# Patient Record
Sex: FEMALE | Race: WHITE | HISPANIC OR LATINO | Employment: FULL TIME | URBAN - METROPOLITAN AREA
[De-identification: names, ages, dates, MRNs, and addresses within clinical notes are randomized per-mention and may not be internally consistent; named-entity substitution may affect disease eponyms.]

---

## 2021-02-22 ENCOUNTER — AMBULATORY - HEALTHEAST (OUTPATIENT)
Dept: LAB | Facility: CLINIC | Age: 41
End: 2021-02-22

## 2021-02-22 DIAGNOSIS — O99.810 ABNORMAL MATERNAL GLUCOSE TOLERANCE, ANTEPARTUM: ICD-10-CM

## 2021-02-22 LAB
FASTING STATUS PATIENT QL REPORTED: YES
GLUCOSE 1H P 100 G GLC PO SERPL-MCNC: 181 MG/DL (ref 70–179)
GLUCOSE 2H P 100 G GLC PO SERPL-MCNC: 162 MG/DL (ref 70–154)
GLUCOSE 3H P 100 G GLC PO SERPL-MCNC: 139 MG/DL (ref 70–139)
GLUCOSE P FAST SERPL-MCNC: 98 MG/DL (ref 70–94)

## 2021-02-23 ENCOUNTER — RECORDS - HEALTHEAST (OUTPATIENT)
Dept: ADMINISTRATIVE | Facility: OTHER | Age: 41
End: 2021-02-23

## 2021-02-25 ENCOUNTER — COMMUNICATION - HEALTHEAST (OUTPATIENT)
Dept: ADMINISTRATIVE | Facility: CLINIC | Age: 41
End: 2021-02-25

## 2021-03-11 ENCOUNTER — COMMUNICATION - HEALTHEAST (OUTPATIENT)
Dept: EDUCATION SERVICES | Facility: CLINIC | Age: 41
End: 2021-03-11

## 2021-03-11 ENCOUNTER — PATIENT OUTREACH (OUTPATIENT)
Dept: EDUCATION SERVICES | Facility: CLINIC | Age: 41
End: 2021-03-11
Payer: COMMERCIAL

## 2021-03-11 DIAGNOSIS — O24.410 DIET CONTROLLED GESTATIONAL DIABETES MELLITUS (GDM) IN THIRD TRIMESTER: Primary | ICD-10-CM

## 2021-03-11 DIAGNOSIS — O24.419 GESTATIONAL DIABETES: ICD-10-CM

## 2021-03-11 PROCEDURE — 98967 PH1 ASSMT&MGMT NQHP 11-20: CPT | Mod: TEL

## 2021-03-11 NOTE — PROGRESS NOTES
Diabetes Self-Management Education & Support    SUBJECTIVE/OBJECTIVE:  Presents for education related to gestational diabetes.    Accompanied by: Self  Diabetes management related comments/concerns: Dx 3 weeks ago, cutting down on carbohydrates and cut out sugar  Gestational weeks: 31 weeks  Number of previous preganancies: 0  Previously had Gestational Diabetes: No    Cultural Influences/Ethnic Background:  [place]    Estimated Date of Delivery: Data Unavailable    1 hour OGTT  No results found for: GLU1    3 hour OGTT  3 hour GTT - 2021     Fastin   1 hour: 181   2 hours: 162   3 hours: 139    Lifestyle and Health Behaviors:  Exercise:: Yes  Days per week of moderate to strenuous exercise (like a brisk walk): 3  On average, minutes per day of exercise at this level: 30  How intense was your typical exercise? : Light (like stretching or slow walking)  Exercise Minutes per Week: 90  Barrier to exercise: None  Meal planning/habits: Carb counting  Meals include: Breakfast, Dinner, Morning Snack, Afternoon Snack, Lunch  Breakfast: wake up 8 am - breakfast at 9 am: decaf with half and half + scrambled eggs + low carb ww toast  Dinner: grilled chicken, salad, 2 corn tortillas  Snacks: fruit - cheese - veggies with ranch  Other: bedtime 10 - 11 pm, last food at 8 pm  Beverages: Coffee, Water(decaf)  How many servings of fruits/vegetables per day: 5  Biggest challenges to healthy eating: None    Healthy Coping:  Emotional response to diabetes: Ready to learn, Acceptance  Informal Support system:: Family  Stage of change: ACTION (Actively working towards change)    Current Management:  Taking medications for gestational diabetes?: No    ASSESSMENT:  Reviewed target blood glucose values, sharps disposal, diagnosis criteria for GDM and importance of good blood glucose management for health of mom and baby. Patient advised to call if 3 blood glucose elevated before returns next week. Instructed on ketone checking and  told to call if unable to get ketones negative.    Discussed carbohydrate sources and impact on blood glucose. Reviewed basics of healthy eating and incorporating a variety of foods into meal plan. Instructed on carbohydrate counting and label reading and recommended patient consume 2 CHO for breakfast, 3-4 CHO for lunch and dinner and 1-2 CHO for each snack, 3 snacks a day.     Discussed importance of not going too low in carbohydrates since that may cause liver to produce excess glucose and contribute to elevated blood glucose readings and ketone formation. Encouraged eating breakfast within 1 hour of waking.  To also help prevent against ketone formation, protein was encouraged with meals and snacks, especially with the night snack. Reviewed benefits of exercising to help lower blood glucose and walking after meals, as tolerated and per MD approval, if seeing elevated blood glucose after a meal. Pt verbalized understanding of concepts discussed and recommendations provided.    INTERVENTION:  Accu-Chek Guide Me Meter ordered through [employer].     Educational topics covered today:  GDM diagnosis, pathophysiology, Risks and Complications of GDM, Means of controlling GDM, Using a Blood Glucose Monitor, Blood Glucose Goals, Logging and Interpreting Glucose Results, Ketone Testing, When to Call a Diabetes Educator or OB Provider, Healthy Eating During Pregnancy, Counting Carbohydrates, Meal Planning for GDM, and Physical Activity    Educational materials provided today:   Hi Understanding Gestational Diabetes  GDM Log Book  HS Snack List  Meal plan  Sharps Disposal  Care After Delivery  Link to video for Accu-chek Guide Me meter    Pt verbalized understanding of concepts discussed and recommendations provided today.     PLAN:  Check glucose 4 times daily, before breakfast and 1 hour after each meal.     Check Ketones daily for one week, if negative, reduce testing to once a week.     Physical activity  recommended: 20-30 minutes most day.    Meal plan: 2 carbs at breakfast, 3-4 carbs at lunch, 3-4 carbs at supper, 1-2 carbs at 3 snacks a day.  Follow consistent CHO meal plan, eat CHO and protein/fat at all meals/snacks.    Call/e-mail/MyChart message diabetes educator if 3 or more blood sugars are above the goal in 1 week, if ketones are positive, or with questions/concerns.    Jaelyn Mera, MPH, RDN, LD, CDCES   Time Spent: 45 minutes  Encounter Type: Individual    Any diabetes medication dose changes were made via the CDE Protocol and Collaborative Practice Agreement with the patient's referring OB/GYN provider. A copy of this encounter was shared with the provider.

## 2021-03-18 ENCOUNTER — VIRTUAL VISIT (OUTPATIENT)
Dept: EDUCATION SERVICES | Facility: CLINIC | Age: 41
End: 2021-03-18
Payer: COMMERCIAL

## 2021-03-18 DIAGNOSIS — O24.410 DIET CONTROLLED GESTATIONAL DIABETES MELLITUS (GDM) IN THIRD TRIMESTER: Primary | ICD-10-CM

## 2021-03-18 PROCEDURE — 98967 PH1 ASSMT&MGMT NQHP 11-20: CPT | Mod: TEL

## 2021-03-18 NOTE — PROGRESS NOTES
Diabetes Self-Management Education & Support    Type of Service: Telephone Visit    How would patient like to obtain AVS? email      SUBJECTIVE/OBJECTIVE:  Presents for education related to gestational diabetes.    Accompanied by: Self  Diabetes management related comments/concerns: Fasting glucose have been mostly elevated, except one day was 88.  Gestational weeks: 32 weeks  Number of previous preganancies: 0  Previously had Gestational Diabetes: No    Cultural Influences/Ethnic Background:  [place]    There were no vitals taken for this visit. Virtual visit did not allow for taking vitals.    Estimated Date of Delivery: Data Unavailable    Blood Glucose/Ketone Log:  Fasting -104, except one day was 88.  Post-prandial BG all under 140 (114-121), except one day 153 when she ate more than she should have.     Lifestyle and Health Behaviors:  Exercise:: Yes  Days per week of moderate to strenuous exercise (like a brisk walk): 3  On average, minutes per day of exercise at this level: 30  How intense was your typical exercise? : Light (like stretching or slow walking)  Exercise Minutes per Week: 90  Barrier to exercise: None  Meal planning/habits: Carb counting  Meals include: Breakfast, Dinner, Morning Snack, Afternoon Snack, Lunch  Breakfast: wake up 8 am - breakfast at 9 am: decaf with half and half + scrambled eggs + low carb ww toast  Dinner: grilled chicken, salad, 2 corn tortillas  Snacks: cheese and whole wheat crackers  Other: bedtime 10 - 11 pm, last food at 8 pm  Beverages: Coffee, Water(decaf)  How many servings of fruits/vegetables per day: 5  Biggest challenges to healthy eating: None  Pre- vitamin?: Yes    Healthy Coping:  Emotional response to diabetes: Ready to learn, Acceptance  Informal Support system:: Family  Stage of change: ACTION (Actively working towards change)    Current Management:  Taking medications for gestational diabetes?: No    ASSESSMENT:  Fasting blood glucoses: 14% in  target.  After breakfast: 100% in target.  After lunch: 100% in target.  After dinner: 86% in target.    Fasting BG above goal, but bedtime snack is fewer carbohydrates and protein than recommended. Advised to ensure 30 g carbohydrate and aim for ~15 g protein - provided examples and will e-mail bedtime snack list.     INTERVENTION:  Educational topics covered today:  Ensuring adequate carbohydrate/protein/fat at bedtime snack to keep fasting BG in goal    Educational Materials provided today:  No new materials provided today    PLAN:  Check glucose 4 times daily.  Check ketones once a week when readings are consistently negative.  Continue with recommended physical activity.  Continue to follow recommended meal plan: 2 carbs at breakfast, 3-4 carbs at lunch, 3-4 carbs at supper, 1-2 carbs at snacks.  2 carbohydrates and ~15 g protein at bedtime snack.  Follow consistent CHO meal plan, eat CHO and protein/fat at all meals/snacks.    Follow-up on Monday, 3/22 at 10:30 am for BG review.    Jaelyn Mera, MPH, RDN, LD, CDCES   Time Spent: 16 minutes  Encounter Type: Individual    Any diabetes medication dose changes were made via the CDE Protocol and Collaborative Practice Agreement with the patient's OB/GYN provider. A copy of this encounter was shared with the provider.

## 2021-03-22 ENCOUNTER — COMMUNICATION - HEALTHEAST (OUTPATIENT)
Dept: EDUCATION SERVICES | Facility: CLINIC | Age: 41
End: 2021-03-22

## 2021-03-22 ENCOUNTER — VIRTUAL VISIT (OUTPATIENT)
Dept: EDUCATION SERVICES | Facility: CLINIC | Age: 41
End: 2021-03-22
Payer: COMMERCIAL

## 2021-03-22 DIAGNOSIS — O24.414 INSULIN CONTROLLED GESTATIONAL DIABETES MELLITUS (GDM) IN SECOND TRIMESTER: ICD-10-CM

## 2021-03-22 DIAGNOSIS — O24.410 DIET CONTROLLED GESTATIONAL DIABETES MELLITUS (GDM) IN THIRD TRIMESTER: Primary | ICD-10-CM

## 2021-03-22 NOTE — PROGRESS NOTES
Gestational Diabetes Follow-up    Type of Service: Telephone Visit    How would patient like to obtain AVS? Email      Subjective/Objective:    Amelia Olivares sent in blood glucose log for review. Last date of communication was: 3/18/21.    Gestational diabetes is being managed with diet and activity    Taking diabetes medications: no    Estimated Date of Delivery: Data Unavailable    BG/Food Log:   Fasting B, 101, 94, 102  Post-prandial all in goal except 147 mg/dL 2 hours after brown rice and steak with vegetables for dinner last night. Was stressed yesterday with having a virtual baby shower and worrying that it would work right.    Assessment:    Fasting blood glucoses: 25% in target.  After breakfast: 100% in target.  After lunch: 100% in target.  After dinner: 75% in target.    Fasting glucose remain above goal, despite patient having 30 g carbohydrate and 15 grams protein at bedtime snack. Discussed insulin start. She is willing as this will help keep baby healthy. This note faxed to OB via [employer]. Will proceed with getting insulin orders from Julee Holliday NP, and providing insulin start education tomorrow at 2:30 pm. Patient reports fear of needles and sometimes faints when she sees long needles. Reassured that insulin pen needles are 4mm - small, short and thin - discussed option for Auto-cover needles where she does not see the needle at all. Will send her e-mail with AVS and picture of these so she can decide what needle type she'd prefer to try.    Plan/Response:  Recommend that patient begin NPH insulin at bedtime in pen form. Per Julee Holliday NP, plan to start with 8 units at bedtime and increase by 2 units every 2 days until fasting BG are less than 95. This will be ordered in [facility] instance of [employer].     Follow-up tomorrow at 2:30 pm for insulin start education.    Jaelyn Mera, MPH, RDN, LD, CDCES   Time Spent: 15 minutes  Encounter type: Individual    Any diabetes medication  dose changes were made via the CDE Protocol and Collaborative Practice Agreement with the patient's endocrinology provider and OB/GYN provider. A copy of this encounter was shared with the provider.

## 2021-03-22 NOTE — LETTER
3/22/2021         RE: Amelia Olivares  [address] [place] [address]        Dear Colleague,    Thank you for referring your patient, Amelia Olivares, to the [facility].     Based on continued fasting glucose elevation, recommend NPH insulin start at bedtime - 8 units per Julee Holliday NP, per [facility] protocol. Amelia will continue to be followed for insulin dose adjustment until delivery.     Please see a copy of my visit note below.    Gestational Diabetes Follow-up    Type of Service: Telephone Visit    How would patient like to obtain AVS? Email      Subjective/Objective:    Amelia Olivares sent in blood glucose log for review. Last date of communication was: 3/18/21.    Gestational diabetes is being managed with diet and activity    Taking diabetes medications: no    Estimated Date of Delivery: Data Unavailable    BG/Food Log:   Fasting B, 101, 94, 102  Post-prandial all in goal except 147 mg/dL 2 hours after brown rice and steak with vegetables for dinner last night. Was stressed yesterday with having a virtual baby shower and worrying that it would work right.    Assessment:    Fasting blood glucoses: 25% in target.  After breakfast: 100% in target.  After lunch: 100% in target.  After dinner: 75% in target.    Fasting glucose remain above goal, despite patient having 30 g carbohydrate and 15 grams protein at bedtime snack. Discussed insulin start. She is willing as this will help keep baby healthy. This note faxed to OB via [employer]. Will proceed with getting insulin orders from Julee Holilday NP, and providing insulin start education tomorrow at 2:30 pm. Patient reports fear of needles and sometimes faints when she sees long needles. Reassured that insulin pen needles are 4mm - small, short and thin - discussed option for Auto-cover needles where she does not see the needle at all. Will send her e-mail with AVS and picture of these so  she can decide what needle type she'd prefer to try.    Plan/Response:  Recommend that patient begin NPH insulin at bedtime in pen form. Per Julee Holliday NP, plan to start with 8 units at bedtime and increase by 2 units every 2 days until fasting BG are less than 95. This will be ordered in [facility] instance of [employer]     Follow-up tomorrow at 2:30 pm for insulin start education.    Jaelyn Mera, MPH, RDN, LD, CDCES   Time Spent: 15 minutes  Encounter type: Individual    Any diabetes medication dose changes were made via the CDE Protocol and Collaborative Practice Agreement with the patient's endocrinology provider and OB/GYN provider. A copy of this encounter was shared with the provider.

## 2021-03-23 ENCOUNTER — VIRTUAL VISIT (OUTPATIENT)
Dept: EDUCATION SERVICES | Facility: CLINIC | Age: 41
End: 2021-03-23
Payer: COMMERCIAL

## 2021-03-23 DIAGNOSIS — O24.410 DIET CONTROLLED GESTATIONAL DIABETES MELLITUS (GDM) IN THIRD TRIMESTER: Primary | ICD-10-CM

## 2021-03-23 NOTE — PATIENT INSTRUCTIONS
"Taking Insulin:    1. Take NPH (Humulin-N or Novolin-N) - 8 units at bedtime.     - Rotate injection sites, keeping at least 1 inch apart from last injection site and 2 inches away from belly button or surgical scars.    -If you have a cloudy insulin, mix the insulin gently by rolling between your hands 10 times and turning upside down and right side up 10 times. Do not shake.     2. Pen - Use a new pen needle for each injection. Always remove pen needle from the insulin pen after use and do not store insulin pens with the needle on the pen. Do a 2 unit \"prime\" before each injection, be sure a drop or stream of insulin comes out of the needle before you give your injection. After you inject, hold the needle under the skin to the count of 10 to be sure all of the insulin goes in.      3. Store insulin you are not using in the refrigerator (do not freeze). Take new insulin out of the refrigerator a few hours prior to use to bring to room temperature.     4. Once opened NPH Humulin N Marjorieikpens can be kept at room temperature for 14 days.. Do not use the opened insulin after this time has passed, even if there is still medicine inside.     5. Call your doctor or diabetes educator if you begin having low blood sugars or if you have questions or concerns.     6. Complete and mail in the form to inform the [place] [employer] that you have started taking insulin.    7. Follow-up: Follow-up diabetes education appointment scheduled on 3/25/21 at 2:00 pm.    [facility] for the [facility]:  For Your Diabetes Education or Nutrition Appointments Call:  [phone]   For Diabetes or Nutrition Related Questions:   Phone: [phone]  Send GetYou Message   If you need a medication refill please contact your pharmacy. Please allow 3 business days for your refills to be completed.       "

## 2021-03-23 NOTE — PROGRESS NOTES
Gestational Diabetes Follow-up    Type of Service: Telephone Visit    How would patient like to obtain AVS? email    Subjective/Objective:    Amelia Olivares is following up today for review of insulin start instructions.     Gestational diabetes is being managed with diet and activity    Taking diabetes medications: no, will be starting insulin at bedtime.    Estimated Date of Delivery: Data Unavailable    Intervention:  Insulin injection technique taught using a Pen for NPH - 8 units at bedtime. Patient verbalized understanding and was able to perform an accurate return demonstration of injection technique.  Discussed mixing insulin, storage, sharps, new needle each use, site selection, action of insulin and hypoglycemia signs, symptoms and treatment.      Assessment:  Amelia verbalized understanding of insulin administration.       Plan/Response:  Follow-up on Thursday, 3/25.  Follow up with Pregnancy Clinic weekly thereafter.    Jaelyn Mera, MPH, RDN, LD, CDCES   Time spent: 15 minutes  Individual encounter    Any diabetes medication dose changes were made via the CDE Protocol and Collaborative Practice Agreement with the patient's endocrinology provider and OB/GYN provider. A copy of this encounter was shared with the provider.

## 2021-03-25 ENCOUNTER — VIRTUAL VISIT (OUTPATIENT)
Dept: EDUCATION SERVICES | Facility: CLINIC | Age: 41
End: 2021-03-25
Payer: COMMERCIAL

## 2021-03-25 DIAGNOSIS — O24.410 DIET CONTROLLED GESTATIONAL DIABETES MELLITUS (GDM) IN THIRD TRIMESTER: Primary | ICD-10-CM

## 2021-03-25 DIAGNOSIS — O24.414 INSULIN CONTROLLED GESTATIONAL DIABETES MELLITUS (GDM) IN THIRD TRIMESTER: ICD-10-CM

## 2021-03-25 RX ORDER — PEN NEEDLE, DIABETIC 32GX 5/32"
1 NEEDLE, DISPOSABLE MISCELLANEOUS DAILY
COMMUNITY
Start: 2021-03-23 | End: 2024-02-16

## 2021-03-25 NOTE — PROGRESS NOTES
Gestational Diabetes Follow-up    Subjective/Objective:    Amelia Olivares was called for a scheduled BG review. Last date of communication was: 3/23.    Gestational diabetes is being managed with diet, activity and medications    Taking diabetes medications:   yes:     Diabetes Medication(s)     Insulin       insulin  UNIT/ML injection    Inject 8 Units Subcutaneous At Bedtime          Estimated Date of Delivery: Data Unavailable    Blood Glucose/Ketone Log:  Fasting 3/24: 95, 3/25: 104    Assessment:    FBG remain above goal with 8 units. Increase by 2 units to 10 units, per Julee Holliday NP.    Plan/Response:  Recommend increase to insulin - 10 units. Increase by 2 units every 2 days if FBG above goal 50% of the time.   Follow-up with pregnancy clinic - phone number provided to call to schedule.   Otherwise no changes in the patient's current treatment plan.    Jaelyn Mera, MPH, RDN, LD, CDCES   Time Spent: 7 minutes    Any diabetes medication dose changes were made via the CDE Protocol and Collaborative Practice Agreement with the patient's endocrinology provider. A copy of this encounter was shared with the provider.

## 2021-04-07 ENCOUNTER — COMMUNICATION - HEALTHEAST (OUTPATIENT)
Dept: EDUCATION SERVICES | Facility: CLINIC | Age: 41
End: 2021-04-07

## 2021-04-07 ENCOUNTER — OFFICE VISIT - HEALTHEAST (OUTPATIENT)
Dept: ENDOCRINOLOGY | Facility: CLINIC | Age: 41
End: 2021-04-07

## 2021-04-07 DIAGNOSIS — O24.414 INSULIN CONTROLLED GESTATIONAL DIABETES MELLITUS (GDM) IN THIRD TRIMESTER: ICD-10-CM

## 2021-04-20 ENCOUNTER — AMBULATORY - HEALTHEAST (OUTPATIENT)
Dept: LAB | Facility: CLINIC | Age: 41
End: 2021-04-20

## 2021-04-20 ENCOUNTER — AMBULATORY - HEALTHEAST (OUTPATIENT)
Dept: OBGYN | Facility: CLINIC | Age: 41
End: 2021-04-20

## 2021-04-20 DIAGNOSIS — Z33.1 PREGNANT STATE, INCIDENTAL: ICD-10-CM

## 2021-04-20 LAB
SARS-COV-2 PCR COMMENT: NORMAL
SARS-COV-2 RNA SPEC QL NAA+PROBE: NEGATIVE
SARS-COV-2 VIRUS SPECIMEN SOURCE: NORMAL

## 2021-04-21 ENCOUNTER — OFFICE VISIT - HEALTHEAST (OUTPATIENT)
Dept: ENDOCRINOLOGY | Facility: CLINIC | Age: 41
End: 2021-04-21

## 2021-04-21 ENCOUNTER — COMMUNICATION - HEALTHEAST (OUTPATIENT)
Dept: EDUCATION SERVICES | Facility: CLINIC | Age: 41
End: 2021-04-21

## 2021-04-21 ENCOUNTER — COMMUNICATION - HEALTHEAST (OUTPATIENT)
Dept: SCHEDULING | Facility: CLINIC | Age: 41
End: 2021-04-21

## 2021-04-21 DIAGNOSIS — O24.414 INSULIN CONTROLLED GESTATIONAL DIABETES MELLITUS (GDM) IN THIRD TRIMESTER: ICD-10-CM

## 2021-04-23 ENCOUNTER — ANESTHESIA - HEALTHEAST (OUTPATIENT)
Dept: OBGYN | Facility: CLINIC | Age: 41
End: 2021-04-23

## 2021-06-15 NOTE — TELEPHONE ENCOUNTER
Records received  2/23/2021 4:43pm inside DM Consult  [facility] - [phone]  Referring: Dr Jorge Jackson  DX: GDM     2/25 - Records forwarded to [facility] for sooner appt.

## 2021-06-15 NOTE — TELEPHONE ENCOUNTER
Glucose meter and monitoring supplies sent to patient's pharmacy. GDM education started 3/11/2021.     Jaelyn Mera, MPH, RDN, LD, [facility]

## 2021-06-16 NOTE — ANESTHESIA PROCEDURE NOTES
Epidural Block    Patient location during procedure: OB  Time Called: 4/23/2021 6:47 PM  Reason for Block:labor epidural  Staffing:  Performing  Anesthesiologist: Todd Jackson MD  Preanesthetic Checklist  Completed: patient identified, risks, benefits, and alternatives discussed, timeout performed, consent obtained, at patient's request, airway assessed, oxygen available, suction available, emergency drugs available and hand hygiene performed  Procedure  Patient position: sitting  Prep: ChloraPrep and site prepped and draped  Patient monitoring: continuous pulse oximetry, blood pressure and heart rate  Approach: midline  Location: L3-L4  Injection technique: MAURISIO air  Number of Attempts:1  Needle  Needle type: Tuohy   Needle gauge: 18 G     Catheter in Space: 3  Assessment  Sensory level:  No complications      Additional Notes:  Routine placement on 1 attempt, catheter threaded easily without pain or paresthesias, negative aspiration and test dose negative. Patient more comfortable after procedure.

## 2021-06-16 NOTE — TELEPHONE ENCOUNTER
[facility] GDM Care Plan      Assessment/Plan: phone visit today. She is taking 26 units at HS. Doing very well. No concerns.     Pt is being induced tomorrow. Discussed pp care. Pt will call w/ any concerns.     4/15  Fasting  94  1 hour after:  B  112  L  134  D  125     4/16  Fasting  88  1 hour after:  B  131  L  124  D  136     4/17  Fasting  94  1 hour after:  B  120  L  116  D  131     4/18  Fasting  89  1 hour after:  B  114  L  116  D  130     4/19  Fasting  98  1 hour after:  B  139  L  116  D  162     4/20  Fasting  87  1 hour after:  B  104  L  106  D  134     4/21  Fasting  97  1 hour after:  B  120    Time: 20  Visit Type: telephone visit: pregnancy clinic   Provider: Renetta   Provider's Diagnosis (per referral form): Gestational (648.83)  OGTT:   Results for orders placed or performed in visit on 02/22/21   Glucose, Gestational Challenge 2 Hour   Result Value Ref Range    Glucose, 2 Hour 162 (H) 70 - 154 mg/dL   Glucose, Gestational Challenge 1 Hour   Result Value Ref Range    Glucose, 1 Hour 181 (H) 70 - 179 mg/dL   Glucose, Gestational Challenge Fasting   Result Value Ref Range    Glucose, Fasting 98 (H) 70 - 94 mg/dL    Patient Fasting > 8hrs? Yes    Glucose, Gestational Challenge 3 Hour   Result Value Ref Range    Glucose, 3 hour 139 70 - 139 mg/dL      Estimated Date of Delivery: 5/7 - being induced tomorrow   Pregnancy #: 1  Previous GDM: No   Medications:   Current Outpatient Medications:      ACCU-CHEK GUIDE ME GLUCOSE MTR Misc, Use 1 each As Directed as needed., Disp: 1 each, Rfl: 0     ACCU-CHEK GUIDE TEST STRIPS strips, Use 1 each As Directed 4 (four) times a day. Dispense brand per patient's insurance at pharmacy discretion., Disp: 150 strip, Rfl: 3     ACCU-CHEK SOFTCLIX LANCETS lancets, Use 1 each As Directed 4 (four) times a day. Dispense brand per patient's insurance at pharmacy discretion., Disp: 100 each, Rfl: 0     acetone, urine, test Strp, Check with morning urine daily for 1 week or  "until negative for 7 days, then check once a week., Disp: 50 strip, Rfl: 1     insulin NPH (HUMULIN N NPH INSULIN KWIKPEN) 100 unit/mL (3 mL) injection, Inject 8 Units under the skin at bedtime. (Patient taking differently: Inject 22 Units under the skin at bedtime. ), Disp: 15 mL, Rfl: 1     pen needle, diabetic (BD ULTRA-FINE OTIS PEN NEEDLE) 32 gauge x 5/32\" Ndle, Use one daily with insulin dose at bedtime., Disp: 100 each, Rfl: 2     pen needle, diabetic, safety (NOVOFINE AUTOCOVER) 30 gauge x 1/3\" Ndle, Use 1 each As Directed daily., Disp: 100 each, Rfl: 1      BG monitoring goals: Fasting <95; 1 hour post start of meal <140. Test 4 x per day.  Check fasting a.m. ketones: Yes  GDM meal pattern/carb counting taught per guidelines: Yes    Past Goals:  Nutrition: GDM meal plan MET  Activity: Walking after meals when able/staying active MET  Monitoring: BG 4x/day as directed, ketones every morning MET      New Goals:  Nutrition: GDM meal plan   Activity: Walking after meals when able/staying active   Monitoring: BG 4x/day as directed, ketones every morning    DIABETES CARE PLAN AND EDUCATION RECORD    Gestational Diabetes Disease Process/Preconception Care/Management During Pregnancy/Postpartum:Discussed    Meter (per above goals): Discussed    Nutrition Management    Weight: Assessed and Discussed  Portions/Balance: Assessed and Discussed  Carb ID/Count: Assessed and Discussed  Label Reading: Assessed and Discussed  Menu Planning: Assessed and Discussed  Dining Out: Assessed and Discussed  Physical Activity: Assessed and Discussed    Acute Complications: Prevent, Detect, Treat:    Goal Setting and Problem Solving: Assessed and Discussed  Barriers: Assessed and Discussed  Psychosocial Adjustments: Assessed and Discussed    "

## 2021-06-16 NOTE — TELEPHONE ENCOUNTER
[facility] GDM Care Plan      Assessment/Plan: phone visit today. She started insulin a couple weeks ago and has been titrating appropriately. She has not had any lows. Meals are well controlled, occasional small elevations. Ketones are normal.   Pt will continue with everything, continue titrating if needed. She will call w/ any concerns prior to f/u.     18 units at bedtime  4/01  Fasting  94  1 hour after:  B  132  L  112  D  125  18 units at bedtime     4/02  Fasting  94  1 hour after:  B  119  L  100 (2 hours after)  D  111  18 units at bedtime     4/03  Fasting  102  1 hour after:  B  115  L  113  D  144  18 units at bedtime     4/04  Fasting  100  1 hour after:  B  113  L  112  D  135  20 units at bedtime     4/05  Fasting  109  1 hour after:  B  94  L  127  D  150  22 units at bedtime     4/06  Fasting  91  1 hour after:  B  112  L  132  D  122  22 units at bedtime     4/07  Fasting  95  1 hour after:  B  115      Time: 20  Visit Type: telephone visit: pregnancy clinic   Provider: Renetta   Provider's Diagnosis (per referral form): Gestational (648.83)  OGTT:   Results for orders placed or performed in visit on 02/22/21   Glucose, Gestational Challenge 2 Hour   Result Value Ref Range    Glucose, 2 Hour 162 (H) 70 - 154 mg/dL   Glucose, Gestational Challenge 1 Hour   Result Value Ref Range    Glucose, 1 Hour 181 (H) 70 - 179 mg/dL   Glucose, Gestational Challenge Fasting   Result Value Ref Range    Glucose, Fasting 98 (H) 70 - 94 mg/dL    Patient Fasting > 8hrs? Yes    Glucose, Gestational Challenge 3 Hour   Result Value Ref Range    Glucose, 3 hour 139 70 - 139 mg/dL      Estimated Date of Delivery: 5/7  Pregnancy #: 1  Previous GDM: No   Medications:   Current Outpatient Medications:      ACCU-CHEK GUIDE ME GLUCOSE MTR Misc, Use 1 each As Directed as needed., Disp: 1 each, Rfl: 0     ACCU-CHEK GUIDE TEST STRIPS strips, Use 1 each As Directed 4 (four) times a day. Dispense brand per patient's insurance at pharmacy  "discretion., Disp: 150 strip, Rfl: 3     ACCU-CHEK SOFTCLIX LANCETS lancets, Use 1 each As Directed 4 (four) times a day. Dispense brand per patient's insurance at pharmacy discretion., Disp: 100 each, Rfl: 0     acetone, urine, test Strp, Check with morning urine daily for 1 week or until negative for 7 days, then check once a week., Disp: 50 strip, Rfl: 1     insulin NPH (HUMULIN N NPH INSULIN KWIKPEN) 100 unit/mL (3 mL) injection, Inject 8 Units under the skin at bedtime., Disp: 15 mL, Rfl: 1     pen needle, diabetic (BD ULTRA-FINE OTIS PEN NEEDLE) 32 gauge x 5/32\" Ndle, Use one daily with insulin dose at bedtime., Disp: 100 each, Rfl: 2     pen needle, diabetic, safety (NOVOFINE AUTOCOVER) 30 gauge x 1/3\" Ndle, Use 1 each As Directed daily., Disp: 100 each, Rfl: 1      BG monitoring goals: Fasting <95; 1 hour post start of meal <140. Test 4 x per day.  Check fasting a.m. ketones: Yes  GDM meal pattern/carb counting taught per guidelines: Yes    Past Goals:  Nutrition: GDM meal plan MET  Activity: Walking after meals when able/staying active MET  Monitoring: BG 4x/day as directed, ketones every morning MET      New Goals:  Nutrition: GDM meal plan   Activity: Walking after meals when able/staying active   Monitoring: BG 4x/day as directed, ketones every morning    DIABETES CARE PLAN AND EDUCATION RECORD    Gestational Diabetes Disease Process/Preconception Care/Management During Pregnancy/Postpartum:Discussed    Meter (per above goals): Discussed    Nutrition Management    Weight: Assessed and Discussed  Portions/Balance: Assessed and Discussed  Carb ID/Count: Assessed and Discussed  Label Reading: Assessed and Discussed  Menu Planning: Assessed and Discussed  Dining Out: Assessed and Discussed  Physical Activity: Assessed and Discussed    Acute Complications: Prevent, Detect, Treat:    Goal Setting and Problem Solving: Assessed and Discussed  Barriers: Assessed and Discussed  Psychosocial Adjustments: Assessed " and Discussed

## 2021-06-16 NOTE — TELEPHONE ENCOUNTER
Fasting glucoses have been elevated >50% of the time in the last week, despite diet adjustments. Recommend NPH  insulin start - 8 units at bedtime. A copy of the note in [facility] Instance from 3/22/21 is below. Insulin instruction provided today (3/23/21) and will follow-up with patient on Thursday, 3/25 for review. Then plan to follow-up with Endocrinology Pregnancy Clinic.    Jaelyn Mera, MPH, RDN, LD, [facility]         Subjective/Objective:     Amelia HEALY Marshall sent in blood glucose log for review. Last date of communication was: 3/18/21.     Gestational diabetes is being managed with diet and activity     Taking diabetes medications: no     Estimated Date of Delivery: Data Unavailable     BG/Food Log:   Fasting B, 101, 94, 102  Post-prandial all in goal except 147 mg/dL 2 hours after brown rice and steak with vegetables for dinner last night. Was stressed yesterday with having a virtual baby shower and worrying that it would work right.     Assessment:     Fasting blood glucoses: 25% in target.  After breakfast: 100% in target.  After lunch: 100% in target.  After dinner: 75% in target.     Fasting glucose remain above goal, despite patient having 30 g carbohydrate and 15 grams protein at bedtime snack. Discussed insulin start. She is willing as this will help keep baby healthy. This note faxed to OB via [employer]. Will proceed with getting insulin orders from Julee Holliday NP, and providing insulin start education tomorrow at 2:30 pm. Patient reports fear of needles and sometimes faints when she sees long needles. Reassured that insulin pen needles are 4mm - small, short and thin - discussed option for Auto-cover needles where she does not see the needle at all. Will send her e-mail with AVS and picture of these so she can decide what needle type she'd prefer to try.     Plan/Response:  Recommend that patient begin NPH insulin at bedtime in pen form.  Per GDM insulin start Endocrine protocol, plan to start with 8 units at bedtime and increase by 2 units every 2 days until fasting BG are less than 95. This will be ordered in [facility] instance of [facility] per Julee Holliday NP.

## 2021-06-16 NOTE — PROGRESS NOTES
Amelia Olivares is a 40 y.o. female who is being evaluated via a billable video visit.      How would you like to obtain your AVS? Mail a copy.  If dropped from the video visit, the video invitation should be resent by: Text to cell phone: [phone]  Will anyone else be joining your video visit? No      Video Start Time: 1040    [facility]  ENDOCRINOLOGY    Gestational Diabetes 2021    Amelia Olivares, 1980, [id number]          Reason for visit      1. Insulin controlled gestational diabetes mellitus (GDM) in third trimester        HPI     Amelia Olivares is a very pleasant 40 y.o. old female who presents for GESTATIONAL Diabetes Mellitus.  She is currently 37  weeks pregnant . Due date is 21   Diagnosed with GDM based on an OGTT. She hasnot had  GDM in prior pregnancies.   Current carbohydrate intake:consistent with recommendations of 30g-60g-60g.  I have reviewed her blood glucose logs and note that the:  Fasting readings  are:in range on current regimen  Postprandial readings are:in range on current regimen  Current NPH dose: 26 u  Current Prandial insulin: 0  Blood glucose logs/meter brought in and data reviewed and incorporated into decision-making.  Planned delivery at: [facility]  OBGYN: Renetta  Therapy/Interventions in the past:  She has been seen by the Diabetes Educator- and has received instruction on carbohydrate counting and  consistency.  Records from referring provider and other sources have also been reviewed and incorporated into decision-making.      TODAY:  Amelia is contacted today in f/u for GDM. We are joined by ELSA Schwartz. Amelia is being induced 21. She has provided BG today and they look quite good. Baby is moving appropriately and she is having no swelling. Postpartum instructions given and all questions answered to her satisfaction.     Blood Glucose Numbers:    4/15  Fasting  94  1 hour after:  B  112  L  134  D  125      Fasting  88  1  hour after:  B  131  L  124  D  136    4/17  Fasting  94  1 hour after:  B  120  L  116  D  131    4/18  Fasting  89  1 hour after:  B  114  L  116  D  130    4/19  Fasting  98  1 hour after:  B  139  L  116  D  162    4/20  Fasting  87  1 hour after:  B  104  L  106  D  134    4/21  Fasting  97  1 hour after:  B  120          Past Medical History       Patient Active Problem List   Diagnosis     Contraceptive management     Esophageal reflux     Family history of diabetes mellitus (DM)     Family history of ovarian cancer     Gestational diabetes mellitus     Migraine     Pregnant     Screening for cervical cancer        Past Surgical History     No past surgical history on file.    Family History     No family history on file.    Social History     Social History     Tobacco Use     Smoking status: Not on file   Substance Use Topics     Alcohol use: Not on file     Drug use: Not on file       Review of Systems     Patient has no polyuria or polydipsia, no chest pain, dyspnea or TIA's, no numbness, tingling or pain in extremities  Remainder negative except as noted in HPI.      Vital Signs     There were no vitals taken for this visit.  Wt Readings from Last 3 Encounters:   No data found for Wt       Physical Exam     GENERAL: Pleasant, alert, appropriate appearance for age. No acute distress,   HEENT: Normocephalic, atraumatic  NECK: Supple, no masses or  lymph nodes.  CHEST/RESPIRATORY: Normal chest wall and respirations.   SKIN: No melanosis,  ecchymosis,  vitiligo. No acanthosis nigricans  NEURO:  Non-focal, normal DTRs; no tremor.  PSYCH: Alert and oriented -appropriate affect. Orientation, judgement and memory appear intact.    Assessment     1. Insulin controlled gestational diabetes mellitus (GDM) in third trimester        Plan     1. GESTATIONAL DIABETES-  Adjust dose as follows:    -NPH [id number]   units. Increase by 2 units every 2 days to keep fasting blood glucose below 95mg/dL  -Novolog 0  units with  breakfast  -Novolog 0 units with lunch   -Novolog 0 units with dinner  -Increase by 0 units every 2 days to keep 1 hour after meal blood glucose less than 140mg/dL    We reviewed glucose goals of fasting blood glucose <95 mg/dL and 1 hour post prandial blood glucose of <140 mg/dL.    Monitor blood sugar 4 times daily: Fasting  and 1 hour after each meal.  Contact  this clinic [phone] if blood glucose is not within the above-mentioned goals.     We discussed the importance of excellent glycemic control during pregnancy to limit complications such as fetal macrosomia, shoulder dystocia,  hypoglycemia and hyperbilirubinemia.  I have discussed the patient's increased risk of recurrent GDM and/or development of type 2 diabetes later in life.        F/up with A1c 3 months postpartum with PCP.    May be a candidate for metformin postpartum as she has more than 1 risk factor for future Type 2 Diabetes Mellitus.    She will need a screening A1c at least annually, TLC- including carbohydrate control and exercise.    After you deliver the baby, it is suggested to check your blood sugar occasionally (1 - 2 times per week) for 6 weeks.     When you are not pregnant, normal blood sugars are:       Fasting (before eating anything in the morning)  - under 100 mg/dl    2 hours after meals under 140  The American Diabetes Association recommends:     a glucose tolerance test 6 weeks after you deliver the baby.         a hemoglobin Alc test yearly after delivery.  The Alc test is a 2-3 month average blood sugar reading.        Discuss getting these tests with your provider.       After delivery, and your provider has said that it is safe to be active, work toward getting 150 minutes each week of physical activity to decrease your risk of  getting type 2 diabetes.       Maintaining a healthy body weight will also decrease your risk of getting type 2 diabetes.           Zoila Holliday  2021          Lab Results     No  "results found for: HGBA1C, CREATININE, MICROALBUR    No results found for: CHOL, HDL, LDLCALC, TRIG    No results found for: ALT, AST, GGT, ALKPHOS, BILITOT      Current Medications     Outpatient Medications Prior to Visit   Medication Sig Dispense Refill     ACCU-CHEK GUIDE ME GLUCOSE MTR Misc Use 1 each As Directed as needed. 1 each 0     ACCU-CHEK GUIDE TEST STRIPS strips Use 1 each As Directed 4 (four) times a day. Dispense brand per patient's insurance at pharmacy discretion. 150 strip 3     ACCU-CHEK SOFTCLIX LANCETS lancets Use 1 each As Directed 4 (four) times a day. Dispense brand per patient's insurance at pharmacy discretion. 100 each 0     acetone, urine, test Strp Check with morning urine daily for 1 week or until negative for 7 days, then check once a week. 50 strip 1     insulin NPH (HUMULIN N NPH INSULIN KWIKPEN) 100 unit/mL (3 mL) injection Inject 8 Units under the skin at bedtime. (Patient taking differently: Inject 26 Units under the skin at bedtime. ) 15 mL 1     pen needle, diabetic (BD ULTRA-FINE OTIS PEN NEEDLE) 32 gauge x 5/32\" Ndle Use one daily with insulin dose at bedtime. 100 each 2     pen needle, diabetic, safety (NOVOFINE AUTOCOVER) 30 gauge x 1/3\" Ndle Use 1 each As Directed daily. 100 each 1     No facility-administered medications prior to visit.        Video-Visit Details    Type of service:  Video Visit    Video End Time (time video stopped): 1100  Originating Location (pt. Location): Home    Distant Location (provider location):  [facility]     Platform used for Video Visit: Ronn                       "

## 2021-06-16 NOTE — PROGRESS NOTES
Amelia Olivares is a 40 y.o. female who is being evaluated via a billable video visit.      How would you like to obtain your AVS? Mail a copy.  If dropped from the video visit, the video invitation should be resent by: Text to cell phone: [phone]  Will anyone else be joining your video visit? No      Video Start Time: 1120      Reason for visit      1. Insulin controlled gestational diabetes mellitus (GDM) in third trimester        HPI     Amelia Olivares is a very pleasant 40 y.o. old female who presents for GESTATIONAL Diabetes Mellitus.  She is currently 35  weeks pregnant . Due date is 21   Diagnosed with GDM based on an OGTT. She hasnot had  GDM in prior pregnancies.   Current carbohydrate intake:consistent with recommendations of 30g-60g-60g.  I have reviewed her blood glucose logs and note that the:  Fasting readings  are:in range on current regimen  Postprandial readings are:in range on current regimen  Current NPH dose: 22 u  Current Prandial insulin: 0  Blood glucose logs/meter brought in and data reviewed and incorporated into decision-making.  Planned delivery at: [facility]  [facility]GYN: [facility]    Therapy/Interventions in the past:  She has been seen by the Diabetes Educator- and has received instruction on carbohydrate counting and  consistency.  Records from referring provider and other sources have also been reviewed and incorporated into decision-making.      TODAY:    Amelia is contacted today after starting insulin for GDM. We are joined by ELSA Schwartz. She has supplied BG and they look really good. She has been titrating appropriately. Baby is moving and she is having mild, resolvable swelling. Ketones have been negative and she has had no hypoglycemia.     Blood Glucose Numbers:    18 units at bedtime  4  Fasting  94  1 hour after:  B  132  L  112  D  125  18 units at bedtime    4  Fasting  94  1 hour after:  B  119  L  100 (2 hours after)  D  111  18 units at  bedtime    4/03  Fasting  102  1 hour after:  B  115  L  113  D  144  18 units at bedtime    4/04  Fasting  100  1 hour after:  B  113  L  112  D  135  20 units at bedtime    4/05  Fasting  109  1 hour after:  B  94  L  127  D  150  22 units at bedtime    4/06  Fasting  91  1 hour after:  B  112  L  132  D  122  22 units at bedtime    4/07  Fasting  95  1 hour after:  B  115          Past Medical History       Patient Active Problem List   Diagnosis     Contraceptive management     Esophageal reflux     Family history of diabetes mellitus (DM)     Family history of ovarian cancer     Gestational diabetes mellitus     Migraine     Pregnant     Screening for cervical cancer        Past Surgical History     No past surgical history on file.    Family History     No family history on file.    Social History     Social History     Tobacco Use     Smoking status: Not on file   Substance Use Topics     Alcohol use: Not on file     Drug use: Not on file       Review of Systems     Patient has no polyuria or polydipsia, no chest pain, dyspnea or TIA's, no numbness, tingling or pain in extremities  Remainder negative except as noted in HPI.      Vital Signs     There were no vitals taken for this visit.  Wt Readings from Last 3 Encounters:   No data found for Wt       Physical Exam     GENERAL: Pleasant, alert, appropriate appearance for age. No acute distress,   HEENT: Normocephalic, atraumatic  NECK: No noted abnormalities  CHEST/RESPIRATORY: Normal chest wall and respirations.   SKIN: No melanosis,  ecchymosis,  vitiligo. No acanthosis nigricans  NEURO: no tremor.  PSYCH: Alert and oriented -appropriate affect. Orientation, judgement and memory appear intact.  MSK: No joint abnormalities, FROM in all four extremities. No kyphosis    Assessment     1. Insulin controlled gestational diabetes mellitus (GDM) in third trimester        Plan       1. GESTATIONAL DIABETES-  Adjust dose as follows:    -NPH [id number]   units.  Increase by 2 units every 2 days to keep fasting blood glucose below 95mg/dL  -Novolog 0  units with breakfast  -Novolog 0 units with lunch   -Novolog 0 units with dinner  -Increase by 0 units every 2 days to keep 1 hour after meal blood glucose less than 140mg/dL    We reviewed glucose goals of fasting blood glucose <95 mg/dL and 1 hour post prandial blood glucose of <140 mg/dL.    Monitor blood sugar 4 times daily: Fasting  and 1 hour after each meal.  Contact  this clinic [phone] if blood glucose is not within the above-mentioned goals.     We discussed the importance of excellent glycemic control during pregnancy to limit complications such as fetal macrosomia, shoulder dystocia,  hypoglycemia and hyperbilirubinemia.  I have discussed the patient's increased risk of recurrent GDM and/or development of type 2 diabetes later in life.      F/u in 2 weeks.       Zoila Holliday  2021          Lab Results     No results found for: HGBA1C, CREATININE, MICROALBUR    No results found for: CHOL, HDL, LDLCALC, TRIG    No results found for: ALT, AST, GGT, ALKPHOS, BILITOT      Current Medications     Outpatient Medications Prior to Visit   Medication Sig Dispense Refill     ACCU-CHEK GUIDE ME GLUCOSE MTR Misc Use 1 each As Directed as needed. 1 each 0     ACCU-CHEK GUIDE TEST STRIPS strips Use 1 each As Directed 4 (four) times a day. Dispense brand per patient's insurance at pharmacy discretion. 150 strip 3     ACCU-CHEK SOFTCLIX LANCETS lancets Use 1 each As Directed 4 (four) times a day. Dispense brand per patient's insurance at pharmacy discretion. 100 each 0     acetone, urine, test Strp Check with morning urine daily for 1 week or until negative for 7 days, then check once a week. 50 strip 1     insulin NPH (HUMULIN N NPH INSULIN KWIKPEN) 100 unit/mL (3 mL) injection Inject 8 Units under the skin at bedtime. (Patient taking differently: Inject 22 Units under the skin at bedtime. ) 15 mL 1     pen  "needle, diabetic (BD ULTRA-FINE OTIS PEN NEEDLE) 32 gauge x 5/32\" Ndle Use one daily with insulin dose at bedtime. 100 each 2     pen needle, diabetic, safety (NOVOFINE AUTOCOVER) 30 gauge x 1/3\" Ndle Use 1 each As Directed daily. 100 each 1     No facility-administered medications prior to visit.        Video-Visit Details    Type of service:  Video Visit    Video End Time (time video stopped): 1140  Originating Location (pt. Location): Home    Distant Location (provider location):  [facility]     Platform used for Video Visit: Aris    Date of last OV: Consult  Reason for visit: GDM                       "

## 2021-06-16 NOTE — ANESTHESIA PREPROCEDURE EVALUATION
Anesthesia Evaluation      Patient summary reviewed   No history of anesthetic complications     Airway   Mallampati: III  Neck ROM: full   Pulmonary - normal exam                          Cardiovascular - negative ROS and normal exam   Neuro/Psych - negative ROS     Endo/Other    (+) diabetes mellitus, obesity, pregnant     GI/Hepatic/Renal    (+) GERD,             Dental - normal exam                        Anesthesia Plan  Planned anesthetic: epidural  Called to place a labor epidural in this patient in L&D.  Patient ID, interviewed and examined at the bedside with RN present throughout. Discussed with patient the procedure of epidural placement, expectations and risks (bleeding, infection, headache, decreased blood pressure, high block with LOC, and poor analgesia possibly requiring replacement).  I have answered all questions.  She consents to proceed with epidural placement.  See epidural procedure note. Time out performed prior to block placement and infusion initiation.       ASA 3     Anesthetic plan and risks discussed with: patient

## 2021-06-16 NOTE — ANESTHESIA POSTPROCEDURE EVALUATION
Patient: Amelia Olivares  * No procedures listed *  Anesthesia type: epidural    Patient location: Labor and Delivery  Last vitals: No vitals data found for the desired time range.    Post vital signs: stable  Level of consciousness: awake and responds to simple questions  Post-anesthesia pain: pain controlled  Post-anesthesia nausea and vomiting: no  Pulmonary: unassisted, return to baseline  Cardiovascular: stable and blood pressure at baseline  Hydration: adequate  Anesthetic events: no    QCDR Measures:  ASA# 11 - Teresa-op Cardiac Arrest: ASA11B - Patient did NOT experience unanticipated cardiac arrest  ASA# 12 - Teresa-op Mortality Rate: ASA12B - Patient did NOT die  ASA# 13 - PACU Re-Intubation Rate: NA - No ETT / LMA used for case  ASA# 10 - Composite Anes Safety: ASA10A - No serious adverse event    Additional Notes: Numbness resolved, ambulating and voiding spontaneously, denies headache or backache. No complaints or concerns.

## 2021-07-14 PROBLEM — Z34.90 PREGNANT: Status: RESOLVED | Noted: 2020-10-01 | Resolved: 2021-04-25

## 2021-07-14 PROBLEM — O24.419 GESTATIONAL DIABETES MELLITUS: Status: RESOLVED | Noted: 2021-04-08 | Resolved: 2021-04-25

## 2021-08-08 ENCOUNTER — HEALTH MAINTENANCE LETTER (OUTPATIENT)
Age: 41
End: 2021-08-08

## 2021-10-03 ENCOUNTER — HEALTH MAINTENANCE LETTER (OUTPATIENT)
Age: 41
End: 2021-10-03

## 2022-09-10 ENCOUNTER — HEALTH MAINTENANCE LETTER (OUTPATIENT)
Age: 42
End: 2022-09-10

## 2022-10-31 NOTE — PATIENT INSTRUCTIONS
Your 1 week follow-up Diabetes Education visit is scheduled on 3/18 at 10 am    1. Check blood sugar 4 times a day, before breakfast and 1 hour after the start of each meal.     2. Check urine ketones when you wake up every morning for 7 days. If negative everyday, reduce testing to once a week.    3. Follow the recommended meal plan: eat something every 2-3 hours, include protein/fat and carbohydrate at every meal and snack, have 30 g carbs at breakfast, 45-60 g carbs at lunch, 45-60 g carbs at supper, 15-30 g carbs at 3 snacks per day.     4. Add activity to every day, try walking or being active after each meal to help control blood sugar levels.    5.Call or send a [employer] message to your educator if 3 or more blood sugars are above goal in 1 week, you have an elevated ketone result (trace or higher), or with questions or concerns.    [place] [facility] for the [facility]:  For Your Diabetes Education or Nutrition Appointments Call:  [phone]   For Diabetes Education and Nutrition Related Questions:   Phone: [phone]  Send [employer] Message   If you need a medication refill please contact your pharmacy. Please allow 3 business days for your refills to be completed.     
[id number]: || ||00\01||False;

## 2023-10-01 ENCOUNTER — HEALTH MAINTENANCE LETTER (OUTPATIENT)
Age: 43
End: 2023-10-01

## 2024-02-14 PROBLEM — Z12.4 SCREENING FOR CERVICAL CANCER: Status: ACTIVE | Noted: 2017-03-02

## 2024-02-14 PROBLEM — Z34.90 PREGNANT: Status: ACTIVE | Noted: 2020-10-01

## 2024-02-16 ENCOUNTER — OFFICE VISIT (OUTPATIENT)
Dept: FAMILY MEDICINE | Facility: CLINIC | Age: 44
End: 2024-02-16
Payer: COMMERCIAL

## 2024-02-16 VITALS
WEIGHT: 156 LBS | HEIGHT: 60 IN | SYSTOLIC BLOOD PRESSURE: 107 MMHG | DIASTOLIC BLOOD PRESSURE: 72 MMHG | TEMPERATURE: 98 F | BODY MASS INDEX: 30.63 KG/M2 | HEART RATE: 74 BPM | OXYGEN SATURATION: 97 %

## 2024-02-16 DIAGNOSIS — N83.209 CYST OF OVARY, UNSPECIFIED LATERALITY: ICD-10-CM

## 2024-02-16 DIAGNOSIS — Z01.818 PREOPERATIVE EXAMINATION: Primary | ICD-10-CM

## 2024-02-16 PROBLEM — N92.0 MENORRHAGIA WITH REGULAR CYCLE: Status: ACTIVE | Noted: 2023-12-01

## 2024-02-16 PROBLEM — M79.674 GREAT TOE PAIN, RIGHT: Status: ACTIVE | Noted: 2022-08-16

## 2024-02-16 PROBLEM — O24.419 GESTATIONAL DIABETES MELLITUS: Status: ACTIVE | Noted: 2021-04-08

## 2024-02-16 PROBLEM — L30.9 DERMATITIS: Status: ACTIVE | Noted: 2023-12-01

## 2024-02-16 PROBLEM — N84.0 ENDOMETRIAL POLYP: Status: ACTIVE | Noted: 2023-02-28

## 2024-02-16 PROBLEM — Z86.32 HISTORY OF GESTATIONAL DIABETES: Status: ACTIVE | Noted: 2022-08-15

## 2024-02-16 PROBLEM — R73.03 PREDIABETES: Status: ACTIVE | Noted: 2022-08-16

## 2024-02-16 PROCEDURE — 99204 OFFICE O/P NEW MOD 45 MIN: CPT | Performed by: FAMILY MEDICINE

## 2024-02-16 RX ORDER — ACETAMINOPHEN 325 MG/1
325 TABLET ORAL
COMMUNITY
Start: 2023-04-28 | End: 2024-02-16

## 2024-02-16 RX ORDER — HYDROCORTISONE 2.5 %
CREAM (GRAM) TOPICAL
COMMUNITY
Start: 2023-12-01 | End: 2024-02-16

## 2024-02-16 RX ORDER — IBUPROFEN 600 MG/1
600 TABLET, FILM COATED ORAL
COMMUNITY
Start: 2023-04-28 | End: 2024-02-16

## 2024-02-16 ASSESSMENT — PAIN SCALES - GENERAL: PAINLEVEL: NO PAIN (0)

## 2024-02-16 NOTE — PROGRESS NOTES
Preoperative Evaluation  [facility]  [address] [address]  Phone: [phone]  Fax: [phone]  Primary Provider: MOHSEN Santacruz [facility]  Pre-op Performing Provider: RODO AVILES  Feb 16, 2024       Amelia is a 43 year old, presenting for the following:  Pre-Op Exam (pre-op, 02/21/2024, [facility], Dr. Jackson, surgical laparoscopy with fulguration/excision of lesions of the ovary/pelvis)        2/16/2024     1:14 PM   Additional Questions   Roomed by [facility], Visit Facilitator     Surgical Information  Surgery/Procedure: surgical laparoscopy with fulguration/excision of lesions of the ovary/pelvis  Surgery Location: [facility]  Surgeon: Dr. Jackson  Surgery Date: 02/21/2024  Time of Surgery: TBD  Where patient plans to recover: At home with family  Fax number for surgical facility: Note does not need to be faxed, will be available electronically in Epic.    Assessment & Plan     The proposed surgical procedure is considered INTERMEDIATE risk.    Preoperative examination  Patient is very low risk for cardiovascular complications.  Patient may proceed with surgery without need for further testing.    Cyst of ovary, unspecified laterality  Agree with surgery.            - No identified additional risk factors other than previously addressed    Antiplatelet or Anticoagulation Medication Instructions   - Patient is on no antiplatelet or anticoagulation medications.    Additional Medication Instructions  Patient is on no additional chronic medications    Recommendation  APPROVAL GIVEN to proceed with proposed procedure, without further diagnostic evaluation.          Subjective       HPI related to upcoming procedure: Ovarian cyst with menorrhagia      2/16/2024     1:11 PM   Preop Questions   1. Have you ever had a heart attack or stroke? No   2. Have you ever had surgery on your heart or blood vessels, such as a stent placement, a  coronary artery bypass, or surgery on an artery in your head, neck, heart, or legs? No   3. Do you have chest pain with activity? No   4. Do you have a history of  heart failure? No   5. Do you currently have a cold, bronchitis or symptoms of other infection? No   6. Do you have a cough, shortness of breath, or wheezing? No   7. Do you or anyone in your family have previous history of blood clots? No   8. Do you or does anyone in your family have a serious bleeding problem such as prolonged bleeding following surgeries or cuts? No   9. Have you ever had problems with anemia or been told to take iron pills? No   10. Have you had any abnormal blood loss such as black, tarry or bloody stools, or abnormal vaginal bleeding? No   11. Have you ever had a blood transfusion? No   12. Are you willing to have a blood transfusion if it is medically needed before, during, or after your surgery? Yes   13. Have you or any of your relatives ever had problems with anesthesia? No   14. Do you have sleep apnea, excessive snoring or daytime drowsiness? No   15. Do you have any artifical heart valves or other implanted medical devices like a pacemaker, defibrillator, or continuous glucose monitor? No   16. Do you have artificial joints? No   17. Are you allergic to latex? No   18. Is there any chance that you may be pregnant? No       Health Care Directive  Patient does not have a Health Care Directive or Living Will: Discussed advance care planning with patient; information given to patient to review.    Preoperative Review of    reviewed - no record of controlled substances prescribed.          Patient Active Problem List    Diagnosis Date Noted    Dermatitis 12/01/2023     Priority: Medium    Menorrhagia with regular cycle 12/01/2023     Priority: Medium    Endometrial polyp 02/28/2023     Priority: Medium     Formatting of this note might be different from the original.   Added automatically from request for surgery [id number]       Great toe pain, right 08/16/2022     Priority: Medium    Prediabetes 08/16/2022     Priority: Medium    History of gestational diabetes 08/15/2022     Priority: Medium    Gestational diabetes mellitus 04/08/2021     Priority: Medium    Pregnant 10/01/2020     Priority: Medium    Screening for cervical cancer 03/02/2017     Priority: Medium     2008 NILM  2010 NILM  2012 NILM  2014 ASCUS; HPV NEG  2017 ASCUS HPV NEG   36 y.o.  PLAN: cotest 2/2020  ; Pap test history  2008 NILM   2010 NILM   2012 NILM   2014 ASCUS; HPV NEG   2017 ASCUS HPV NEG   36 y.o.   PLAN: cotest 2/2020      ; Pap test history  Formatting of this note might be different from the original.   2008 NILM   2010 NILM   2012 NILM   2014 ASCUS; HPV NEG   2017 ASCUS HPV NEG   36 y.o.   PLAN: cotest 2/2020      ; Pap test history      Family history of diabetes mellitus (DM) 03/01/2016     Priority: Medium    Family history of ovarian cancer 03/01/2016     Priority: Medium     MGM in her 60's  MGM in her 60's  Formatting of this note might be different from the original.   MGM in her 60's      Contraceptive management 10/08/2007     Priority: Medium     LW Onset:  2003  ; Contraceptive Management  NOS  LW Onset:  2003   ; Contraceptive Management  NOS  Formatting of this note might be different from the original.   LW Onset:  2003   ; Contraceptive Management  NOS      Migraine 10/08/2007     Priority: Medium     Migraine  NOS  Migraine  NOS  Formatting of this note might be different from the original.   Migraine  NOS      Esophageal reflux 06/07/2003     Priority: Medium     Gastroesophageal Reflux Disease  Gastroesophageal Reflux Disease  Formatting of this note might be different from the original.   Gastroesophageal Reflux Disease        No past medical history on file.  No past surgical history on file.  Current Outpatient Medications   Medication Sig Dispense Refill    acetaminophen (TYLENOL) 325 MG tablet 325 mg (Patient not taking: Reported on  2/16/2024)      acetaminophen-codeine 300-30 MG per tablet Take 1-2 tablets by mouth every 4 hours as needed for mild pain (Patient not taking: Reported on 2/16/2024) 15 tablet 0    Acetone, Urine, Test (KETOSTIX VI) 1 each daily Once daily until negative x 1 week then once weekly (Patient not taking: Reported on 2/16/2024)      blood glucose (NO BRAND SPECIFIED) test strip 1 each 4 times daily (Patient not taking: Reported on 2/16/2024)      hydrocortisone 2.5 % cream APPLY TOPICALLY TO THE AFFECTED AREA NECK TWICE DAILY AS NEEDED FOR RASH OR ITCHING OR INFLAMMATION (Patient not taking: Reported on 2/16/2024)      ibuprofen (ADVIL/MOTRIN) 600 MG tablet Take 600 mg by mouth (Patient not taking: Reported on 2/16/2024)      insulin  UNIT/ML injection Inject 8 Units Subcutaneous At Bedtime (Patient not taking: Reported on 2/16/2024)      insulin pen needle (BD OTIS U/F) 32G X 4 MM miscellaneous Inject 1 each Subcutaneous daily (Patient not taking: Reported on 2/16/2024)      naproxen (NAPROSYN) 500 MG tablet Take 1 tablet (500 mg) by mouth 2 times daily as needed for moderate pain (Patient not taking: Reported on 2/16/2024) 30 tablet 0       Allergies   Allergen Reactions    No Known Allergies         Social History     Tobacco Use    Smoking status: Never     Passive exposure: Never    Smokeless tobacco: Never   Substance Use Topics    Alcohol use: Not Currently       History   Drug Use Unknown         Review of Systems    Review of Systems  CONSTITUTIONAL: NEGATIVE for fever, chills, change in weight  INTEGUMENTARY/SKIN: NEGATIVE for worrisome rashes, moles or lesions  EYES: NEGATIVE for vision changes or irritation  ENT/MOUTH: NEGATIVE for ear, mouth and throat problems  RESP: NEGATIVE for significant cough or SOB  CV: NEGATIVE for chest pain, palpitations or peripheral edema  GI: NEGATIVE for nausea, abdominal pain, heartburn, or change in bowel habits  : NEGATIVE for frequency, dysuria, or  "hematuria  MUSCULOSKELETAL: NEGATIVE for significant arthralgias or myalgia  NEURO: NEGATIVE for weakness, dizziness or paresthesias  ENDOCRINE: NEGATIVE for temperature intolerance, skin/hair changes  HEME: NEGATIVE for bleeding problems  PSYCHIATRIC: NEGATIVE for changes in mood or affect  Objective    /72 (BP Location: Left arm, Patient Position: Sitting, Cuff Size: Adult Regular)   Pulse 74   Temp 98  F (36.7  C) (Oral)   Ht 1.53 m (5' 0.25\")   Wt 70.8 kg (156 lb)   LMP 02/05/2024 (Approximate)   SpO2 97%   BMI 30.21 kg/m     Estimated body mass index is 30.21 kg/m  as calculated from the following:    Height as of this encounter: 1.53 m (5' 0.25\").    Weight as of this encounter: 70.8 kg (156 lb).  Physical Exam  GENERAL: alert and no distress  EYES: Eyes grossly normal to inspection, PERRL and conjunctivae and sclerae normal  HENT: ear canals and TM's normal, nose and mouth without ulcers or lesions  NECK: no adenopathy, no asymmetry, masses, or scars  RESP: lungs clear to auscultation - no rales, rhonchi or wheezes  CV: regular rate and rhythm, normal S1 S2, no S3 or S4, no murmur, click or rub, no peripheral edema  ABDOMEN: soft, nontender, no hepatosplenomegaly, no masses and bowel sounds normal  MS: no gross musculoskeletal defects noted, no edema  SKIN: no suspicious lesions or rashes  NEURO: Normal strength and tone, mentation intact and speech normal  PSYCH: mentation appears normal, affect normal/bright        Diagnostics  No labs were ordered during this visit.   No EKG required, no history of coronary heart disease, significant arrhythmia, peripheral arterial disease or other structural heart disease.    Revised Cardiac Risk Index (RCRI)  The patient has the following serious cardiovascular risks for perioperative complications:   - No serious cardiac risks = 0 points     RCRI Interpretation: 0 points: Class I (very low risk - 0.4% complication rate)         Signed Electronically by: " Antony Harper MD  Copy of this evaluation report is provided to requesting physician.

## 2024-02-21 ENCOUNTER — ANESTHESIA EVENT (OUTPATIENT)
Dept: SURGERY | Facility: HOSPITAL | Age: 44
End: 2024-02-21
Payer: COMMERCIAL

## 2024-02-21 ENCOUNTER — ANESTHESIA (OUTPATIENT)
Dept: SURGERY | Facility: HOSPITAL | Age: 44
End: 2024-02-21
Payer: COMMERCIAL

## 2024-02-21 ENCOUNTER — HOSPITAL ENCOUNTER (OUTPATIENT)
Facility: HOSPITAL | Age: 44
Discharge: HOME | End: 2024-02-21
Attending: OBSTETRICS & GYNECOLOGY | Admitting: OBSTETRICS & GYNECOLOGY
Payer: COMMERCIAL

## 2024-02-21 VITALS
DIASTOLIC BLOOD PRESSURE: 64 MMHG | TEMPERATURE: 98.5 F | SYSTOLIC BLOOD PRESSURE: 107 MMHG | OXYGEN SATURATION: 99 % | RESPIRATION RATE: 20 BRPM | WEIGHT: 156 LBS | HEIGHT: 61 IN | HEART RATE: 67 BPM | BODY MASS INDEX: 29.45 KG/M2

## 2024-02-21 DIAGNOSIS — N80.9 ENDOMETRIOSIS: Primary | ICD-10-CM

## 2024-02-21 LAB
HCG UR QL: NEGATIVE
HGB BLD-MCNC: 13 G/DL (ref 11.7–15.7)

## 2024-02-21 PROCEDURE — 258N000003 HC RX IP 258 OP 636: Performed by: ANESTHESIOLOGY

## 2024-02-21 PROCEDURE — 370N000017 HC ANESTHESIA TECHNICAL FEE, PER MIN: Performed by: OBSTETRICS & GYNECOLOGY

## 2024-02-21 PROCEDURE — 999N000141 HC STATISTIC PRE-PROCEDURE NURSING ASSESSMENT: Performed by: OBSTETRICS & GYNECOLOGY

## 2024-02-21 PROCEDURE — 85018 HEMOGLOBIN: CPT | Performed by: PHYSICIAN ASSISTANT

## 2024-02-21 PROCEDURE — 710N000009 HC RECOVERY PHASE 1, LEVEL 1, PER MIN: Performed by: OBSTETRICS & GYNECOLOGY

## 2024-02-21 PROCEDURE — 250N000011 HC RX IP 250 OP 636: Performed by: NURSE ANESTHETIST, CERTIFIED REGISTERED

## 2024-02-21 PROCEDURE — 250N000013 HC RX MED GY IP 250 OP 250 PS 637: Performed by: ANESTHESIOLOGY

## 2024-02-21 PROCEDURE — 710N000012 HC RECOVERY PHASE 2, PER MINUTE: Performed by: OBSTETRICS & GYNECOLOGY

## 2024-02-21 PROCEDURE — 250N000011 HC RX IP 250 OP 636: Performed by: OBSTETRICS & GYNECOLOGY

## 2024-02-21 PROCEDURE — 360N000077 HC SURGERY LEVEL 4, PER MIN: Performed by: OBSTETRICS & GYNECOLOGY

## 2024-02-21 PROCEDURE — 88305 TISSUE EXAM BY PATHOLOGIST: CPT | Mod: TC | Performed by: OBSTETRICS & GYNECOLOGY

## 2024-02-21 PROCEDURE — 81025 URINE PREGNANCY TEST: CPT | Performed by: PHYSICIAN ASSISTANT

## 2024-02-21 PROCEDURE — 250N000009 HC RX 250: Performed by: NURSE ANESTHETIST, CERTIFIED REGISTERED

## 2024-02-21 PROCEDURE — 250N000025 HC SEVOFLURANE, PER MIN: Performed by: OBSTETRICS & GYNECOLOGY

## 2024-02-21 PROCEDURE — 272N000001 HC OR GENERAL SUPPLY STERILE: Performed by: OBSTETRICS & GYNECOLOGY

## 2024-02-21 PROCEDURE — 36415 COLL VENOUS BLD VENIPUNCTURE: CPT | Performed by: PHYSICIAN ASSISTANT

## 2024-02-21 PROCEDURE — 250N000011 HC RX IP 250 OP 636: Performed by: ANESTHESIOLOGY

## 2024-02-21 RX ORDER — ACETAMINOPHEN 325 MG/1
975 TABLET ORAL ONCE
Status: DISCONTINUED | OUTPATIENT
Start: 2024-02-22 | End: 2024-02-21

## 2024-02-21 RX ORDER — CEFAZOLIN SODIUM/WATER 2 G/20 ML
SYRINGE (ML) INTRAVENOUS PRN
Status: DISCONTINUED | OUTPATIENT
Start: 2024-02-21 | End: 2024-02-21

## 2024-02-21 RX ORDER — FENTANYL CITRATE 50 UG/ML
50 INJECTION, SOLUTION INTRAMUSCULAR; INTRAVENOUS EVERY 5 MIN PRN
Status: DISCONTINUED | OUTPATIENT
Start: 2024-02-21 | End: 2024-02-21 | Stop reason: HOSPADM

## 2024-02-21 RX ORDER — OXYCODONE HYDROCHLORIDE 5 MG/1
10 TABLET ORAL
Status: DISCONTINUED | OUTPATIENT
Start: 2024-02-21 | End: 2024-02-21 | Stop reason: HOSPADM

## 2024-02-21 RX ORDER — FENTANYL CITRATE 50 UG/ML
25 INJECTION, SOLUTION INTRAMUSCULAR; INTRAVENOUS
Status: DISCONTINUED | OUTPATIENT
Start: 2024-02-21 | End: 2024-02-21 | Stop reason: HOSPADM

## 2024-02-21 RX ORDER — KETOROLAC TROMETHAMINE 30 MG/ML
INJECTION, SOLUTION INTRAMUSCULAR; INTRAVENOUS PRN
Status: DISCONTINUED | OUTPATIENT
Start: 2024-02-21 | End: 2024-02-21

## 2024-02-21 RX ORDER — ONDANSETRON 4 MG/1
4 TABLET, ORALLY DISINTEGRATING ORAL EVERY 30 MIN PRN
Status: DISCONTINUED | OUTPATIENT
Start: 2024-02-21 | End: 2024-02-21 | Stop reason: HOSPADM

## 2024-02-21 RX ORDER — LIDOCAINE 40 MG/G
CREAM TOPICAL
Status: DISCONTINUED | OUTPATIENT
Start: 2024-02-21 | End: 2024-02-21 | Stop reason: HOSPADM

## 2024-02-21 RX ORDER — OXYCODONE HYDROCHLORIDE 5 MG/1
5 TABLET ORAL
Status: DISCONTINUED | OUTPATIENT
Start: 2024-02-21 | End: 2024-02-21 | Stop reason: HOSPADM

## 2024-02-21 RX ORDER — HALOPERIDOL 5 MG/ML
1 INJECTION INTRAMUSCULAR
Status: DISCONTINUED | OUTPATIENT
Start: 2024-02-21 | End: 2024-02-21 | Stop reason: HOSPADM

## 2024-02-21 RX ORDER — OXYCODONE HYDROCHLORIDE 5 MG/1
5 TABLET ORAL
Status: COMPLETED | OUTPATIENT
Start: 2024-02-21 | End: 2024-02-21

## 2024-02-21 RX ORDER — HYDROMORPHONE HYDROCHLORIDE 1 MG/ML
0.4 INJECTION, SOLUTION INTRAMUSCULAR; INTRAVENOUS; SUBCUTANEOUS EVERY 5 MIN PRN
Status: DISCONTINUED | OUTPATIENT
Start: 2024-02-21 | End: 2024-02-21 | Stop reason: HOSPADM

## 2024-02-21 RX ORDER — ONDANSETRON 2 MG/ML
4 INJECTION INTRAMUSCULAR; INTRAVENOUS EVERY 30 MIN PRN
Status: DISCONTINUED | OUTPATIENT
Start: 2024-02-21 | End: 2024-02-21 | Stop reason: HOSPADM

## 2024-02-21 RX ORDER — PROPOFOL 10 MG/ML
INJECTION, EMULSION INTRAVENOUS PRN
Status: DISCONTINUED | OUTPATIENT
Start: 2024-02-21 | End: 2024-02-21

## 2024-02-21 RX ORDER — KETAMINE HYDROCHLORIDE 10 MG/ML
INJECTION INTRAMUSCULAR; INTRAVENOUS PRN
Status: DISCONTINUED | OUTPATIENT
Start: 2024-02-21 | End: 2024-02-21

## 2024-02-21 RX ORDER — ONDANSETRON 2 MG/ML
INJECTION INTRAMUSCULAR; INTRAVENOUS PRN
Status: DISCONTINUED | OUTPATIENT
Start: 2024-02-21 | End: 2024-02-21

## 2024-02-21 RX ORDER — LIDOCAINE HYDROCHLORIDE 10 MG/ML
INJECTION, SOLUTION INFILTRATION; PERINEURAL PRN
Status: DISCONTINUED | OUTPATIENT
Start: 2024-02-21 | End: 2024-02-21

## 2024-02-21 RX ORDER — FENTANYL CITRATE 50 UG/ML
25 INJECTION, SOLUTION INTRAMUSCULAR; INTRAVENOUS EVERY 5 MIN PRN
Status: DISCONTINUED | OUTPATIENT
Start: 2024-02-21 | End: 2024-02-21 | Stop reason: HOSPADM

## 2024-02-21 RX ORDER — NALOXONE HYDROCHLORIDE 0.4 MG/ML
0.1 INJECTION, SOLUTION INTRAMUSCULAR; INTRAVENOUS; SUBCUTANEOUS
Status: DISCONTINUED | OUTPATIENT
Start: 2024-02-21 | End: 2024-02-21 | Stop reason: HOSPADM

## 2024-02-21 RX ORDER — ACETAMINOPHEN 325 MG/1
975 TABLET ORAL ONCE
Status: DISCONTINUED | OUTPATIENT
Start: 2024-02-21 | End: 2024-02-21 | Stop reason: HOSPADM

## 2024-02-21 RX ORDER — DEXAMETHASONE SODIUM PHOSPHATE 10 MG/ML
INJECTION, SOLUTION INTRAMUSCULAR; INTRAVENOUS PRN
Status: DISCONTINUED | OUTPATIENT
Start: 2024-02-21 | End: 2024-02-21

## 2024-02-21 RX ORDER — ACETAMINOPHEN 325 MG/1
975 TABLET ORAL ONCE
Status: COMPLETED | OUTPATIENT
Start: 2024-02-21 | End: 2024-02-21

## 2024-02-21 RX ORDER — SODIUM CHLORIDE, SODIUM LACTATE, POTASSIUM CHLORIDE, CALCIUM CHLORIDE 600; 310; 30; 20 MG/100ML; MG/100ML; MG/100ML; MG/100ML
INJECTION, SOLUTION INTRAVENOUS CONTINUOUS
Status: DISCONTINUED | OUTPATIENT
Start: 2024-02-21 | End: 2024-02-21 | Stop reason: HOSPADM

## 2024-02-21 RX ORDER — MEPERIDINE HYDROCHLORIDE 25 MG/ML
12.5 INJECTION INTRAMUSCULAR; INTRAVENOUS; SUBCUTANEOUS EVERY 5 MIN PRN
Status: DISCONTINUED | OUTPATIENT
Start: 2024-02-21 | End: 2024-02-21 | Stop reason: HOSPADM

## 2024-02-21 RX ORDER — BUPIVACAINE HYDROCHLORIDE 2.5 MG/ML
INJECTION, SOLUTION INFILTRATION; PERINEURAL PRN
Status: DISCONTINUED | OUTPATIENT
Start: 2024-02-21 | End: 2024-02-21 | Stop reason: HOSPADM

## 2024-02-21 RX ORDER — FENTANYL CITRATE 50 UG/ML
INJECTION, SOLUTION INTRAMUSCULAR; INTRAVENOUS PRN
Status: DISCONTINUED | OUTPATIENT
Start: 2024-02-21 | End: 2024-02-21

## 2024-02-21 RX ORDER — MAGNESIUM SULFATE 4 G/50ML
4 INJECTION INTRAVENOUS ONCE
Status: COMPLETED | OUTPATIENT
Start: 2024-02-21 | End: 2024-02-21

## 2024-02-21 RX ORDER — HYDROMORPHONE HYDROCHLORIDE 1 MG/ML
0.2 INJECTION, SOLUTION INTRAMUSCULAR; INTRAVENOUS; SUBCUTANEOUS EVERY 5 MIN PRN
Status: DISCONTINUED | OUTPATIENT
Start: 2024-02-21 | End: 2024-02-21 | Stop reason: HOSPADM

## 2024-02-21 RX ORDER — OXYCODONE HYDROCHLORIDE 5 MG/1
5-10 TABLET ORAL EVERY 4 HOURS PRN
Qty: 10 TABLET | Refills: 0 | Status: SHIPPED | OUTPATIENT
Start: 2024-02-21

## 2024-02-21 RX ORDER — IBUPROFEN 200 MG
800 TABLET ORAL ONCE
Qty: 4 TABLET | Refills: 0 | Status: DISCONTINUED | OUTPATIENT
Start: 2024-02-21 | End: 2024-02-21 | Stop reason: HOSPADM

## 2024-02-21 RX ADMIN — ROCURONIUM BROMIDE 50 MG: 50 INJECTION, SOLUTION INTRAVENOUS at 16:01

## 2024-02-21 RX ADMIN — FENTANYL CITRATE 25 MCG: 50 INJECTION, SOLUTION INTRAMUSCULAR; INTRAVENOUS at 17:21

## 2024-02-21 RX ADMIN — KETAMINE HYDROCHLORIDE 25 MG: 10 INJECTION INTRAMUSCULAR; INTRAVENOUS at 16:01

## 2024-02-21 RX ADMIN — MIDAZOLAM 2 MG: 1 INJECTION INTRAMUSCULAR; INTRAVENOUS at 15:54

## 2024-02-21 RX ADMIN — DEXAMETHASONE SODIUM PHOSPHATE 10 MG: 10 INJECTION, SOLUTION INTRAMUSCULAR; INTRAVENOUS at 16:01

## 2024-02-21 RX ADMIN — SUGAMMADEX 200 MG: 100 INJECTION, SOLUTION INTRAVENOUS at 16:38

## 2024-02-21 RX ADMIN — ACETAMINOPHEN 975 MG: 325 TABLET ORAL at 13:24

## 2024-02-21 RX ADMIN — MAGNESIUM SULFATE HEPTAHYDRATE 4 G: 80 INJECTION, SOLUTION INTRAVENOUS at 14:05

## 2024-02-21 RX ADMIN — KETAMINE HYDROCHLORIDE 25 MG: 10 INJECTION INTRAMUSCULAR; INTRAVENOUS at 15:56

## 2024-02-21 RX ADMIN — OXYCODONE HYDROCHLORIDE 5 MG: 5 TABLET ORAL at 18:30

## 2024-02-21 RX ADMIN — LIDOCAINE HYDROCHLORIDE 5 ML: 10 INJECTION, SOLUTION INFILTRATION; PERINEURAL at 16:01

## 2024-02-21 RX ADMIN — PROPOFOL 200 MG: 10 INJECTION, EMULSION INTRAVENOUS at 16:01

## 2024-02-21 RX ADMIN — ONDANSETRON 4 MG: 2 INJECTION INTRAMUSCULAR; INTRAVENOUS at 16:31

## 2024-02-21 RX ADMIN — FENTANYL CITRATE 100 MCG: 50 INJECTION INTRAMUSCULAR; INTRAVENOUS at 15:54

## 2024-02-21 RX ADMIN — SODIUM CHLORIDE, POTASSIUM CHLORIDE, SODIUM LACTATE AND CALCIUM CHLORIDE: 600; 310; 30; 20 INJECTION, SOLUTION INTRAVENOUS at 16:34

## 2024-02-21 RX ADMIN — SODIUM CHLORIDE, POTASSIUM CHLORIDE, SODIUM LACTATE AND CALCIUM CHLORIDE: 600; 310; 30; 20 INJECTION, SOLUTION INTRAVENOUS at 14:06

## 2024-02-21 RX ADMIN — Medication 2 G: at 16:04

## 2024-02-21 RX ADMIN — KETOROLAC TROMETHAMINE 15 MG: 30 INJECTION, SOLUTION INTRAMUSCULAR at 16:31

## 2024-02-21 ASSESSMENT — ACTIVITIES OF DAILY LIVING (ADL)
ADLS_ACUITY_SCORE: 18
ADLS_ACUITY_SCORE: 35
ADLS_ACUITY_SCORE: 18

## 2024-02-21 NOTE — H&P
History and Physical Update    I have examined the patient and reviewed the history and physical that is present on this chart. The changes in the patient's history and physical condition are as follows:    None    Jorge Jackson MD

## 2024-02-21 NOTE — ANESTHESIA PREPROCEDURE EVALUATION
Anesthesia Pre-Procedure Evaluation    Patient: Amelia Carlisle   MRN: [id number] : 1980        Procedure : Procedure(s):  SURGICAL LAPAROSCOPY WITH FULGURATION/EXCISION OF LESIONS OF THE OVARY/PELVIC  VISCERA/PERITONEAL SURFACE, HYSTEROSCOPY WITH BIOPSY OF ENDOMETRIUM AND/OR POLYPECTOMY,  AND TUBAL DYE STUDY          Past Medical History:   Diagnosis Date    Cyst of ovary     Dermatitis     Endometrial polyp     Esophageal reflux     History of gestational diabetes     Migraine     Prediabetes       History reviewed. No pertinent surgical history.   Allergies   Allergen Reactions    No Known Allergies       Social History     Tobacco Use    Smoking status: Never     Passive exposure: Never    Smokeless tobacco: Never   Substance Use Topics    Alcohol use: Not Currently      Wt Readings from Last 1 Encounters:   24 70.8 kg (156 lb)        Anesthesia Evaluation   Pt has had prior anesthetic.     No history of anesthetic complications       ROS/MED HX  ENT/Pulmonary:  - neg pulmonary ROS     Neurologic:     (+)      migraines,                          Cardiovascular:  - neg cardiovascular ROS     METS/Exercise Tolerance:     Hematologic:  - neg hematologic  ROS     Musculoskeletal:  - neg musculoskeletal ROS     GI/Hepatic:     (+) GERD, Asymptomatic on medication,                  Renal/Genitourinary:  - neg Renal ROS     Endo:     (+)               Obesity,       Psychiatric/Substance Use:  - neg psychiatric ROS     Infectious Disease:  - neg infectious disease ROS     Malignancy:  - neg malignancy ROS     Other:  - neg other ROS          Physical Exam    Airway  airway exam normal      Mallampati: II   TM distance: > 3 FB   Neck ROM: full   Mouth opening: > 3 cm    Respiratory Devices and Support         Dental  no notable dental history     (+) Minor Abnormalities - some fillings, tiny chips      Cardiovascular   cardiovascular exam normal       Rhythm and rate: regular and normal  "    Pulmonary   pulmonary exam normal        breath sounds clear to auscultation           OUTSIDE LABS:  CBC:   Lab Results   Component Value Date    HGB 13.0 02/21/2024     BMP:   Lab Results   Component Value Date    GLC 96 04/25/2021     04/24/2021     COAGS: No results found for: \"PTT\", \"INR\", \"FIBR\"  POC:   Lab Results   Component Value Date    HCG Negative 02/21/2024     HEPATIC: No results found for: \"ALBUMIN\", \"PROTTOTAL\", \"ALT\", \"AST\", \"GGT\", \"ALKPHOS\", \"BILITOTAL\", \"BILIDIRECT\", \"LUZ ELENA\"  OTHER: No results found for: \"PH\", \"LACT\", \"A1C\", \"TRISTA\", \"PHOS\", \"MAG\", \"LIPASE\", \"AMYLASE\", \"TSH\", \"T4\", \"T3\", \"CRP\", \"SED\"    Anesthesia Plan    ASA Status:  2    NPO Status:  NPO Appropriate    Anesthesia Type: General.     - Airway: ETT   Induction: Intravenous, Propofol.   Maintenance: TIVA.        Consents    Anesthesia Plan(s) and associated risks, benefits, and realistic alternatives discussed. Questions answered and patient/representative(s) expressed understanding.     - Discussed:     - Discussed with:  Patient            Postoperative Care    Pain management: IV analgesics, Oral pain medications, Multi-modal analgesia.   PONV prophylaxis: Ondansetron (or other 5HT-3), Dexamethasone or Solumedrol, Droperidol or Haldol     Comments:               Evan Hui MD    I have reviewed the pertinent notes and labs in the chart from the past 30 days and (re)examined the patient.  Any updates or changes from those notes are reflected in this note.              # Overweight: Estimated body mass index is 29.48 kg/m  as calculated from the following:    Height as of this encounter: 1.549 m (5' 1\").    Weight as of this encounter: 70.8 kg (156 lb).      "

## 2024-02-21 NOTE — BRIEF OP NOTE
[facility]    Brief Operative Note    Pre-operative diagnosis: Pain in pelvis [R10.2]  Post-operative diagnosis Endometriosis    Procedure: SURGICAL LAPAROSCOPY WITH FULGURATION/EXCISION OF LESIONS OF THE OVARY/PELVIC, N/A - Abdomen  VISCERA/PERITONEAL SURFACE, HYSTEROSCOPY WITH BIOPSY OF ENDOMETRIUM AND/OR POLYPECTOMY,, N/A - Vagina  AND TUBAL DYE STUDY, N/A - Abdomen    Surgeon: Surgeon(s) and Role:     * Jorge Jackson MD - Primary  Anesthesia: General   Estimated Blood Loss: Minimal    Drains: None  Specimens: * No specimens in log *  Findings:   None.  Complications: None.  Implants: * No implants in log *

## 2024-02-21 NOTE — ANESTHESIA PROCEDURE NOTES
Airway       Patient location during procedure: OR       Procedure Start/Stop Times: 2/21/2024 4:02 PM and 2/21/2024 4:03 PM  Staff -        CRNA: Oumou García APRN CRNA       Performed By: CRNA  Consent for Airway        Urgency: elective  Indications and Patient Condition       Indications for airway management: kevin-procedural and airway protection       Induction type:intravenous       Mask difficulty assessment: 1 - vent by mask    Final Airway Details       Final airway type: endotracheal airway       Successful airway: ETT - single  Endotracheal Airway Details        ETT size (mm): 6.5       Cuffed: yes       Successful intubation technique: direct laryngoscopy       DL Blade Type: [place] 2       Grade View of Cords: 1       Adjucts: stylet       Position: Right       Measured from: gums/teeth       Secured at (cm): 21       Bite block used: None    Post intubation assessment        Placement verified by: capnometry, equal breath sounds and chest rise        Number of attempts at approach: 1       Number of other approaches attempted: 0       Secured with: commercial tube posey and tape       Ease of procedure: easy       Dentition: Intact and Unchanged    Medication(s) Administered   Medication Administration Time: 2/21/2024 4:02 PM

## 2024-02-21 NOTE — ANESTHESIA CARE TRANSFER NOTE
Patient: Amelia Carlisle    Procedure: Procedure(s):  SURGICAL LAPAROSCOPY WITH FULGURATION/EXCISION OF LESIONS OF THE OVARY/PELVIC  VISCERA/PERITONEAL SURFACE, HYSTEROSCOPY WITH BIOPSY OF ENDOMETRIUM AND/OR POLYPECTOMY,  AND TUBAL DYE STUDY       Diagnosis: Pain in pelvis [R10.2]  Diagnosis Additional Information: No value filed.    Anesthesia Type:   General     Note:    Oropharynx: oropharynx clear of all foreign objects and spontaneously breathing  Level of Consciousness: awake  Oxygen Supplementation: face mask  Level of Supplemental Oxygen (L/min / FiO2): 6  Independent Airway: airway patency satisfactory and stable  Dentition: dentition unchanged  Vital Signs Stable: post-procedure vital signs reviewed and stable  Report to RN Given: handoff report given  Patient transferred to: PACU    Handoff Report: Identifed the Patient, Identified the Reponsible Provider, Reviewed the pertinent medical history, Discussed the surgical course, Reviewed Intra-OP anesthesia mangement and issues during anesthesia, Set expectations for post-procedure period and Allowed opportunity for questions and acknowledgement of understanding      Vitals:  Vitals Value Taken Time   BP     Temp     Pulse     Resp     SpO2         Electronically Signed By: JAREK Lofton CRNA  February 21, 2024  4:55 PM

## 2024-02-22 NOTE — ANESTHESIA POSTPROCEDURE EVALUATION
Patient: Amelia Carlisle    Procedure: Procedure(s):  SURGICAL LAPAROSCOPY, LYSIS OF ADHESIONS, CAUTERIZATION OF ENDOMETRIOSIS, FENESTRATION OF BILATERAL OVARIAN CYST.  HYSTEROSCOPY  AND DILATION AND CURETTAGE.  AND TUBAL DYE STUDY       Anesthesia Type:  General    Note:  Disposition: Inpatient   Postop Pain Control: Uneventful            Sign Out: Well controlled pain   PONV: No   Neuro/Psych: Uneventful            Sign Out: Acceptable/Baseline neuro status   Airway/Respiratory: Uneventful            Sign Out: Acceptable/Baseline resp. status   CV/Hemodynamics: Uneventful            Sign Out: Acceptable CV status; No obvious hypovolemia; No obvious fluid overload   Other NRE: NONE   DID A NON-ROUTINE EVENT OCCUR? No       Last vitals:  Vitals Value Taken Time   /60 02/21/24 1745   Temp 36.9  C (98.5  F) 02/21/24 1659   Pulse 75 02/21/24 1758   Resp 13 02/21/24 1758   SpO2 98 % 02/21/24 1802   Vitals shown include unfiled device data.    Electronically Signed By: Todd Durant MD  February 21, 2024  6:06 PM

## 2024-02-23 LAB
PATH REPORT.COMMENTS IMP SPEC: NORMAL
PATH REPORT.FINAL DX SPEC: NORMAL
PATH REPORT.GROSS SPEC: NORMAL
PATH REPORT.MICROSCOPIC SPEC OTHER STN: NORMAL
PATH REPORT.RELEVANT HX SPEC: NORMAL
PHOTO IMAGE: NORMAL

## 2024-02-23 PROCEDURE — 88305 TISSUE EXAM BY PATHOLOGIST: CPT | Mod: 26 | Performed by: PATHOLOGY

## 2024-03-06 NOTE — OP NOTE
PROCEDURE NOTE - LAPAROSCOPY, HYSTEROSCOPY AND DILATION AND CURETTAGE     NAME: Amelia Carlisle   :  1980   MRN: [id number]      DATE OF SERVICE: 2024     PREOPERATIVE DIAGNOSIS:   pelvic pain    POSTOPERATIVE DIAGNOSIS:   cul-de-sac endometreosis    PROCEDURES: laparoscopy, fulgration of endometriosis, tubal dye study, and dilation and curettage,hysteroscopy    SURGEON: Jorge Jackson MD     ASSISTANT:  Terrie Long    ANESTHESIA: general     ESTIMATED BLOOD LOSS: * No blood loss amount entered *    HISTORY OF PRESENT ILLNESS:   Amelia Carlisle is a 43 year old female with history ofpelvic pain, progressive in nature, with abnormal uterine bleeding    CONSENT:  She was met preoperatively, where we discussed the procedure and the risks associated with the procedure. She understood these to be, but not limited to injury to adjacent organs including bladder, bowel, ureter, infection and bleeding. Patient signed consent.    PROCEDURE  Patient was  brought back to the operating room in stable condition. Patient underwent induction of a general anesthetic, she was carefully prepped and draped in sterile fashion in the dorsal lithotomy position. Timeout was performed. Bladder was drained with a Brian catheter, uterine manipulator placed into the uterus.     Attention was turned to the abdomen. An incision above the umbilicus. A Veress needle was introduced with a 2 pop technique, saline drop test confirmed adequate placement. Opening pressure was 3-4. Insufflation was done until 15mm of pressure were established. A 5 nonbladed trocar was placed above the umbilicus. Two more port sights were place in the right and left lower quadrants under direct visualization. Trandelenburg assistance was then used.     The procedure began with identifying the anatomy. The uterus was visualized was found to be normal appearing, tubes appeared normal, ovaries were  abnormal, noted to be a small  amount of endometriosis on the surface .     There was endometriosis in the culde sac and over both uterosacral ligaments.    The trocars were then removed and the gas was removed from the abdomen. Skin was closed with 4-0 vicryl suture. Steri-Strips applied. Procedure was then turned to the lower field. A bimanual exam was done. The anterior lip of the cervix was regrasped with single tooth tenaculum. Uterus was then sounded to 8cm. The cervix was gently dilated using Mary dilators and the hysteroscope was introduced. Cavity of the uterus was noted to be normal. At this point endometrial curettings were obtained. In each case all quadrants were sampled, and the tissue was submitted for pathologic exam. At this point good hemostasis was noted with this portion of the procedure. Instruments were removed from vagina. No active bleeding was noted. Sponge and needle counts were correct X 2. She was taken to recovery in stable condition.    Jorge Jackson MD      CC: [facility] Jorge Jackson MD

## 2024-03-15 ENCOUNTER — LAB REQUISITION (OUTPATIENT)
Dept: LAB | Facility: CLINIC | Age: 44
End: 2024-03-15
Payer: COMMERCIAL

## 2024-03-15 DIAGNOSIS — E28.2 POLYCYSTIC OVARIAN SYNDROME: ICD-10-CM

## 2024-03-15 PROCEDURE — 84443 ASSAY THYROID STIM HORMONE: CPT | Mod: ORL | Performed by: OBSTETRICS & GYNECOLOGY

## 2024-03-15 PROCEDURE — 82166 ASSAY ANTI-MULLERIAN HORM: CPT | Mod: ORL | Performed by: OBSTETRICS & GYNECOLOGY

## 2024-03-16 LAB — TSH SERPL DL<=0.005 MIU/L-ACNC: 2.66 UIU/ML (ref 0.3–4.2)

## 2024-03-17 LAB
Lab: NORMAL
PERFORMING LABORATORY: NORMAL
SPECIMEN STATUS: NORMAL
TEST NAME: NORMAL

## 2024-03-19 LAB — MISCELLANEOUS TEST 1 (ARUP): NORMAL

## 2024-04-02 ENCOUNTER — LAB REQUISITION (OUTPATIENT)
Dept: LAB | Facility: CLINIC | Age: 44
End: 2024-04-02
Payer: COMMERCIAL

## 2024-04-02 DIAGNOSIS — E28.2 POLYCYSTIC OVARIAN SYNDROME: ICD-10-CM

## 2024-04-02 PROCEDURE — 84144 ASSAY OF PROGESTERONE: CPT | Mod: ORL | Performed by: OBSTETRICS & GYNECOLOGY

## 2024-04-03 LAB — PROGEST SERPL-MCNC: 9.4 NG/ML

## 2024-06-21 ENCOUNTER — LAB REQUISITION (OUTPATIENT)
Dept: LAB | Facility: CLINIC | Age: 44
End: 2024-06-21
Payer: COMMERCIAL

## 2024-06-21 DIAGNOSIS — E28.2 POLYCYSTIC OVARIAN SYNDROME: ICD-10-CM

## 2024-06-21 PROCEDURE — 84144 ASSAY OF PROGESTERONE: CPT | Mod: ORL | Performed by: OBSTETRICS & GYNECOLOGY

## 2024-06-22 LAB — PROGEST SERPL-MCNC: 16.5 NG/ML

## 2024-11-24 ENCOUNTER — HEALTH MAINTENANCE LETTER (OUTPATIENT)
Age: 44
End: 2024-11-24

## 2025-08-09 ENCOUNTER — HEALTH MAINTENANCE LETTER (OUTPATIENT)
Age: 45
End: 2025-08-09

## (undated) DEVICE — GLOVE SURG PI ULTRA TOUCH M SZ 7-1/2 LF

## (undated) DEVICE — ANTIFOG SOLUTION SEE SHARP 150M TROCAR SWABS 30978

## (undated) DEVICE — ENDO TROCAR FIRST ENTRY KII FIOS Z-THRD 05X100MM CTF03

## (undated) DEVICE — SUCTION STRYKERFLOW II 250-070-500

## (undated) DEVICE — GOWN XXL 9575

## (undated) DEVICE — DRSG TELFA 3X4" 1050

## (undated) DEVICE — CATH FOLEY 16FR 5ML LUBRICATH LATEX 0165L16

## (undated) DEVICE — NEEDLE MNJCT HYPO AL HUB 21GAX2IN 8881200318

## (undated) DEVICE — SU VICRYL+ 0 27 UR6 VLT VCP603H

## (undated) DEVICE — PREP POVIDONE-IODINE 10% SOLUTION 4OZ BOTTLE MDS093944

## (undated) DEVICE — SEAL SET MYOSURE ROD LENS SCOPE SINGLE USE 40-902

## (undated) DEVICE — MAT FLOOR SURGICAL 40X38 0702140238

## (undated) DEVICE — PAD POS XL 1X20X40IN PINK PIGAZZI

## (undated) DEVICE — CUSTOM PACK PERI GYN SMA5BPGHEA

## (undated) DEVICE — PREP POVIDONE-IODINE 7.5% SCRUB 4OZ BOTTLE MDS093945

## (undated) DEVICE — ENDO TROCAR SLEEVE KII Z-THREADED 05X100MM CTS02

## (undated) DEVICE — KIT PROCEDURE FLUENT IN/OUT FLOWPAK TISS TRAP FLT-112S

## (undated) DEVICE — SUCTION MANIFOLD NEPTUNE 2 SYS 1 PORT 702-025-000

## (undated) DEVICE — Device

## (undated) DEVICE — PREP CHLORAPREP 26ML TINTED HI-LITE ORANGE 930815

## (undated) DEVICE — SOLUTION IRRIG 2B7127 .9NS 3000ML BAG

## (undated) DEVICE — UTERINE MANIPULATOR HUMI KRONER 6003

## (undated) DEVICE — SOL WATER IRRIG 1000ML BOTTLE 2F7114

## (undated) DEVICE — SOL NACL 0.9% IRRIG 1000ML BOTTLE 2F7124

## (undated) DEVICE — GLOVE PI ULTRATCH M LF SZ 6.5 PF CUFF TEXT STRL LF 42665

## (undated) DEVICE — CUSTOM PACK PELVISCOPY SMA5BPVHEA

## (undated) DEVICE — TUBING SMOKE EVAC PNEUMOCLEAR HIGH FLOW 0620050250

## (undated) DEVICE — SOL RINGERS LACTATED 1000ML BAG 2B2324X

## (undated) DEVICE — PROTECTOR ARM STANDARD ONE STEP

## (undated) DEVICE — DECANTER VIAL 2006S

## (undated) DEVICE — SYR 30ML LL W/O NDL 302832

## (undated) DEVICE — ESU GROUND PAD ADULT REM W/15' CORD E7507DB

## (undated) DEVICE — GLOVE SURG PI ULTRA TOUCH M SZ 7 LF 42670

## (undated) DEVICE — BRIEF STRETCH XL MPS40

## (undated) DEVICE — ENDO SHEARS RENEW LAP ENDOCUT SCISSOR TIP 16.5MM 3142

## (undated) RX ORDER — LIDOCAINE HYDROCHLORIDE 10 MG/ML
INJECTION, SOLUTION EPIDURAL; INFILTRATION; INTRACAUDAL; PERINEURAL
Status: DISPENSED
Start: 2024-02-21

## (undated) RX ORDER — BUPIVACAINE HYDROCHLORIDE 2.5 MG/ML
INJECTION, SOLUTION INFILTRATION; PERINEURAL
Status: DISPENSED
Start: 2024-02-21

## (undated) RX ORDER — ONDANSETRON 2 MG/ML
INJECTION INTRAMUSCULAR; INTRAVENOUS
Status: DISPENSED
Start: 2024-02-21

## (undated) RX ORDER — KETOROLAC TROMETHAMINE 30 MG/ML
INJECTION, SOLUTION INTRAMUSCULAR; INTRAVENOUS
Status: DISPENSED
Start: 2024-02-21

## (undated) RX ORDER — FENTANYL CITRATE 50 UG/ML
INJECTION, SOLUTION INTRAMUSCULAR; INTRAVENOUS
Status: DISPENSED
Start: 2024-02-21

## (undated) RX ORDER — PROPOFOL 10 MG/ML
INJECTION, EMULSION INTRAVENOUS
Status: DISPENSED
Start: 2024-02-21